# Patient Record
Sex: FEMALE | Race: WHITE | Employment: OTHER | URBAN - METROPOLITAN AREA
[De-identification: names, ages, dates, MRNs, and addresses within clinical notes are randomized per-mention and may not be internally consistent; named-entity substitution may affect disease eponyms.]

---

## 2022-12-15 ENCOUNTER — APPOINTMENT (OUTPATIENT)
Dept: GENERAL RADIOLOGY | Age: 87
DRG: 689 | End: 2022-12-15
Attending: EMERGENCY MEDICINE
Payer: MEDICARE

## 2022-12-15 ENCOUNTER — HOSPITAL ENCOUNTER (INPATIENT)
Age: 87
LOS: 4 days | Discharge: HOME HEALTH | DRG: 689 | End: 2022-12-19
Attending: EMERGENCY MEDICINE | Admitting: FAMILY MEDICINE
Payer: MEDICARE

## 2022-12-15 DIAGNOSIS — N39.0 URINARY TRACT INFECTION WITHOUT HEMATURIA, SITE UNSPECIFIED: Primary | ICD-10-CM

## 2022-12-15 LAB
ALBUMIN SERPL-MCNC: 3.5 G/DL (ref 3.5–5)
ALBUMIN/GLOB SERPL: 0.7 {RATIO} (ref 1.1–2.2)
ALP SERPL-CCNC: 87 U/L (ref 45–117)
ALT SERPL-CCNC: 19 U/L (ref 12–78)
ANION GAP SERPL CALC-SCNC: 5 MMOL/L (ref 5–15)
APPEARANCE UR: ABNORMAL
AST SERPL W P-5'-P-CCNC: 12 U/L (ref 15–37)
BACTERIA URNS QL MICRO: ABNORMAL /HPF
BASOPHILS # BLD: 0 K/UL (ref 0–0.1)
BASOPHILS NFR BLD: 1 % (ref 0–1)
BILIRUB SERPL-MCNC: 0.7 MG/DL (ref 0.2–1)
BILIRUB UR QL: NEGATIVE
BUN SERPL-MCNC: 18 MG/DL (ref 6–20)
BUN/CREAT SERPL: 21 (ref 12–20)
CA-I BLD-MCNC: 10 MG/DL (ref 8.5–10.1)
CHLORIDE SERPL-SCNC: 104 MMOL/L (ref 97–108)
CO2 SERPL-SCNC: 30 MMOL/L (ref 21–32)
COLOR UR: ABNORMAL
CREAT SERPL-MCNC: 0.87 MG/DL (ref 0.55–1.02)
DIFFERENTIAL METHOD BLD: ABNORMAL
EOSINOPHIL # BLD: 0.1 K/UL (ref 0–0.4)
EOSINOPHIL NFR BLD: 1 % (ref 0–7)
ERYTHROCYTE [DISTWIDTH] IN BLOOD BY AUTOMATED COUNT: 13.2 % (ref 11.5–14.5)
FLUAV AG NPH QL IA: NEGATIVE
FLUBV AG NOSE QL IA: NEGATIVE
GLOBULIN SER CALC-MCNC: 4.7 G/DL (ref 2–4)
GLUCOSE SERPL-MCNC: 119 MG/DL (ref 65–100)
GLUCOSE UR STRIP.AUTO-MCNC: NEGATIVE MG/DL
HCT VFR BLD AUTO: 43.1 % (ref 35–47)
HGB BLD-MCNC: 13.8 G/DL (ref 11.5–16)
HGB UR QL STRIP: ABNORMAL
IMM GRANULOCYTES # BLD AUTO: 0 K/UL (ref 0–0.04)
IMM GRANULOCYTES NFR BLD AUTO: 0 % (ref 0–0.5)
KETONES UR QL STRIP.AUTO: NEGATIVE MG/DL
LACTATE SERPL-SCNC: 1.7 MMOL/L (ref 0.4–2)
LEUKOCYTE ESTERASE UR QL STRIP.AUTO: ABNORMAL
LYMPHOCYTES # BLD: 1.1 K/UL (ref 0.8–3.5)
LYMPHOCYTES NFR BLD: 13 % (ref 12–49)
MCH RBC QN AUTO: 29.9 PG (ref 26–34)
MCHC RBC AUTO-ENTMCNC: 32 G/DL (ref 30–36.5)
MCV RBC AUTO: 93.5 FL (ref 80–99)
MONOCYTES # BLD: 1.4 K/UL (ref 0–1)
MONOCYTES NFR BLD: 17 % (ref 5–13)
NEUTS SEG # BLD: 5.8 K/UL (ref 1.8–8)
NEUTS SEG NFR BLD: 68 % (ref 32–75)
NITRITE UR QL STRIP.AUTO: POSITIVE
NRBC # BLD: 0 K/UL (ref 0–0.01)
NRBC BLD-RTO: 0 PER 100 WBC
PH UR STRIP: 5 [PH]
PLATELET # BLD AUTO: 274 K/UL (ref 150–400)
PMV BLD AUTO: 9.6 FL (ref 8.9–12.9)
POTASSIUM SERPL-SCNC: 3.9 MMOL/L (ref 3.5–5.1)
PROT SERPL-MCNC: 8.2 G/DL (ref 6.4–8.2)
PROT UR STRIP-MCNC: NEGATIVE MG/DL
RBC # BLD AUTO: 4.61 M/UL (ref 3.8–5.2)
RBC #/AREA URNS HPF: ABNORMAL /HPF (ref 0–5)
SODIUM SERPL-SCNC: 139 MMOL/L (ref 136–145)
SP GR UR REFRACTOMETRY: 1.02 (ref 1–1.03)
UROBILINOGEN UR QL STRIP.AUTO: 1 EU/DL (ref 0.2–1)
WBC # BLD AUTO: 8.5 K/UL (ref 3.6–11)
WBC URNS QL MICRO: >100 /HPF (ref 0–4)

## 2022-12-15 PROCEDURE — 74011250637 HC RX REV CODE- 250/637: Performed by: EMERGENCY MEDICINE

## 2022-12-15 PROCEDURE — 93005 ELECTROCARDIOGRAM TRACING: CPT

## 2022-12-15 PROCEDURE — 85025 COMPLETE CBC W/AUTO DIFF WBC: CPT

## 2022-12-15 PROCEDURE — 83605 ASSAY OF LACTIC ACID: CPT

## 2022-12-15 PROCEDURE — 36415 COLL VENOUS BLD VENIPUNCTURE: CPT

## 2022-12-15 PROCEDURE — 99285 EMERGENCY DEPT VISIT HI MDM: CPT

## 2022-12-15 PROCEDURE — 65270000029 HC RM PRIVATE

## 2022-12-15 PROCEDURE — 80053 COMPREHEN METABOLIC PANEL: CPT

## 2022-12-15 PROCEDURE — 74011250636 HC RX REV CODE- 250/636: Performed by: FAMILY MEDICINE

## 2022-12-15 PROCEDURE — 74011000250 HC RX REV CODE- 250: Performed by: EMERGENCY MEDICINE

## 2022-12-15 PROCEDURE — 71045 X-RAY EXAM CHEST 1 VIEW: CPT

## 2022-12-15 PROCEDURE — 87040 BLOOD CULTURE FOR BACTERIA: CPT

## 2022-12-15 PROCEDURE — 87804 INFLUENZA ASSAY W/OPTIC: CPT

## 2022-12-15 PROCEDURE — 74011250636 HC RX REV CODE- 250/636: Performed by: EMERGENCY MEDICINE

## 2022-12-15 PROCEDURE — 81001 URINALYSIS AUTO W/SCOPE: CPT

## 2022-12-15 RX ORDER — POLYETHYLENE GLYCOL 3350 17 G/17G
17 POWDER, FOR SOLUTION ORAL DAILY PRN
Status: DISCONTINUED | OUTPATIENT
Start: 2022-12-15 | End: 2022-12-19 | Stop reason: HOSPADM

## 2022-12-15 RX ORDER — ACETAMINOPHEN 650 MG/1
650 SUPPOSITORY RECTAL
Status: DISCONTINUED | OUTPATIENT
Start: 2022-12-15 | End: 2022-12-19 | Stop reason: HOSPADM

## 2022-12-15 RX ORDER — SODIUM CHLORIDE 9 MG/ML
75 INJECTION, SOLUTION INTRAVENOUS CONTINUOUS
Status: DISCONTINUED | OUTPATIENT
Start: 2022-12-15 | End: 2022-12-19 | Stop reason: HOSPADM

## 2022-12-15 RX ORDER — ENOXAPARIN SODIUM 100 MG/ML
40 INJECTION SUBCUTANEOUS DAILY
Status: DISCONTINUED | OUTPATIENT
Start: 2022-12-16 | End: 2022-12-19 | Stop reason: HOSPADM

## 2022-12-15 RX ORDER — ACETAMINOPHEN 325 MG/1
650 TABLET ORAL
Status: DISCONTINUED | OUTPATIENT
Start: 2022-12-15 | End: 2022-12-19 | Stop reason: HOSPADM

## 2022-12-15 RX ORDER — ONDANSETRON 2 MG/ML
4 INJECTION INTRAMUSCULAR; INTRAVENOUS
Status: DISCONTINUED | OUTPATIENT
Start: 2022-12-15 | End: 2022-12-19 | Stop reason: HOSPADM

## 2022-12-15 RX ORDER — ONDANSETRON 4 MG/1
4 TABLET, ORALLY DISINTEGRATING ORAL
Status: DISCONTINUED | OUTPATIENT
Start: 2022-12-15 | End: 2022-12-19 | Stop reason: HOSPADM

## 2022-12-15 RX ORDER — ERYTHROMYCIN 5 MG/G
OINTMENT OPHTHALMIC EVERY 6 HOURS
Status: DISCONTINUED | OUTPATIENT
Start: 2022-12-15 | End: 2022-12-19 | Stop reason: HOSPADM

## 2022-12-15 RX ADMIN — CEFTRIAXONE 2 G: 2 INJECTION, POWDER, FOR SOLUTION INTRAMUSCULAR; INTRAVENOUS at 16:22

## 2022-12-15 RX ADMIN — SODIUM CHLORIDE 75 ML/HR: 9 INJECTION, SOLUTION INTRAVENOUS at 22:31

## 2022-12-15 RX ADMIN — ERYTHROMYCIN 1 EACH: 5 OINTMENT OPHTHALMIC at 16:20

## 2022-12-15 NOTE — ACP (ADVANCE CARE PLANNING)
Advance Care Planning   Healthcare Decision Maker:       Primary Decision Maker: Effie Ruiz - [phone]

## 2022-12-15 NOTE — PROGRESS NOTES
Reason for Admission:  UTI                     RUR Score:  9%                   Plan for utilizing home health:    Pt's son Airam Padilla) signed Choice Letter for pt to return to [facility] sent via [facility][1]/pt uses w/c. PCP: First and Last name:  Seen by MD @ the [facility]. Name of Practice:    Are you a current patient: Yes/No: Yes   Approximate date of last visit: Seen @ the facility. Can you participate in a virtual visit with your PCP: No                    Current Advanced Directive/Advance Care Plan: No Order      Healthcare Decision Maker:     Primary Decision Maker: Angela Arredondo - Son - [phone]                  Transition of Care Plan:                    D/C Plan is to return to [facility] via transportation upon discharge.

## 2022-12-15 NOTE — ED PROVIDER NOTES
EMERGENCY DEPARTMENT HISTORY AND PHYSICAL EXAM      Date: 12/15/2022  Patient Name: Poncho Esparza    History of Presenting Illness     Chief Complaint   Patient presents with    Eye Problem    Lethargy       History Provided By: Patient and Patient's Son    HPI: Poncho Esparza,  77-year-old female with hisotry of afib and gerd brought and done for generalized weakness. Patient has reportedly been more weak over the past 4 to 5 days. They also noticed redness to her left eye with some discharge. Son states she was fine 1 week ago when he saw her, alert and talkative. She has had a cough recently. There are no other complaints, changes, or physical findings at this time. Past History     Past Medical History:  No past medical history on file. Past Surgical History:  No past surgical history on file. Family History:  No family history on file. Social History: Allergies:  No Known Allergies    PCP: Shannan Avila MD    No current facility-administered medications on file prior to encounter. No current outpatient medications on file prior to encounter. Review of Systems   Review of Systems   Constitutional:  Negative for chills and fever. HENT:  Negative for sore throat. Eyes:  Positive for discharge. Negative for redness. Respiratory:  Positive for cough. Negative for shortness of breath. Cardiovascular:  Negative for chest pain. Gastrointestinal:  Negative for abdominal pain, nausea and vomiting. Genitourinary:  Negative for flank pain. Musculoskeletal:  Negative for myalgias. Skin:  Negative for rash. Neurological:  Positive for weakness. Negative for headaches. Physical Exam   Physical Exam  Vitals and nursing note reviewed. Constitutional:       General: She is not in acute distress. Appearance: Normal appearance. HENT:      Head: Normocephalic and atraumatic.       Mouth/Throat:      Mouth: Mucous membranes are moist.   Eyes:      Extraocular Movements: Extraocular movements intact. Conjunctiva/sclera: Conjunctivae normal.   Cardiovascular:      Rate and Rhythm: Normal rate and regular rhythm. Pulmonary:      Effort: Pulmonary effort is normal. No respiratory distress. Breath sounds: Normal breath sounds. No wheezing, rhonchi or rales. Abdominal:      General: There is no distension. Palpations: Abdomen is soft. Tenderness: There is no abdominal tenderness. Musculoskeletal:         General: Normal range of motion. Cervical back: Normal range of motion. Skin:     General: Skin is warm and dry. Neurological:      General: No focal deficit present. Mental Status: She is alert. Comments: Oriented x2. Able to have basic conversation with son. Lab and Diagnostic Study Results   Labs -   No results found for this or any previous visit (from the past 12 hour(s)). Radiologic Studies -   @[email or web address]@  CT Results  (Last 48 hours)      None          CXR Results  (Last 48 hours)      None            Medical Decision Making and ED Course   Differential Diagnosis & Medical Decision Making Provider Note:   80-year-old female presenting with weakness and confusion progressing over the past week. She is weak and less conversant than usual.  Work-up shows evidence of urinary tract infection but otherwise patient is hemodynamically stable. Started on IV Rocephin and admitted to medicine    - I am the first provider for this patient. I reviewed the vital signs, available nursing notes, past medical history, past surgical history, family history and social history. The patients presenting problems have been discussed, and they are in agreement with the care plan formulated and outlined with them. I have encouraged them to ask questions as they arise throughout their visit. Vital Signs-Reviewed the patient's vital signs.   Patient Vitals for the past 12 hrs:   Temp Pulse Resp BP SpO2   12/15/22 1341 98 °F (36.7 °C) 61 18 (!) 150/61 93 %       ED Course:   ED Course as of 12/15/22 1558   Thu Dec 15, 2022   1427 A. fib rate 66 left axis deviation, normal intervals, no ST elevation or depression,  [KK]      ED Course User Index  [KK] Will Cuellar MD          Disposition   Disposition: Admitted to Floor Medical Floor the case was discussed with the admitting physician Dr Galan     Diagnosis/Clinical Impression     Clinical Impression:   1. Urinary tract infection without hematuria, site unspecified        Attestations: Kailey MINOR MD, am the primary clinician of record. Please note that this dictation was completed with Yesmywine, the computer voice recognition software. Quite often unanticipated grammatical, syntax, homophones, and other interpretive errors are inadvertently transcribed by the computer software. Please disregard these errors. Please excuse any errors that have escaped final proofreading. Thank you.

## 2022-12-15 NOTE — Clinical Note
Status[de-identified] INPATIENT [101]   Type of Bed: Medical [8]   Cardiac Monitoring Required?: No   Inpatient Hospitalization Certified Necessary for the Following Reasons: 3.  Patient receiving treatment that can only be provided in an inpatient setting (further clarification in H&P documentation)   Admitting Diagnosis: UTI (urinary tract infection) [920439]   Admitting Physician: Surinder Shelby [[id number]]   Attending Physician: Surinder Shelby [[id number]]   Estimated Length of Stay: 2 Midnights   Discharge Plan[de-identified] Extended Care Facility (e.g. Adult Home, Nursing Home, etc.)

## 2022-12-15 NOTE — ED TRIAGE NOTES
Patient arrives via ems from [facility] for a red left eye that staff noticed this am, patient has dementia htn  afib gerd.  Ems reports that the [facility] states shes been more lethargic than normal and her intake has lessened

## 2022-12-16 LAB
ALBUMIN SERPL-MCNC: 3.2 G/DL (ref 3.5–5)
ALBUMIN/GLOB SERPL: 0.8 {RATIO} (ref 1.1–2.2)
ALP SERPL-CCNC: 79 U/L (ref 45–117)
ALT SERPL-CCNC: 16 U/L (ref 12–78)
ANION GAP SERPL CALC-SCNC: 8 MMOL/L (ref 5–15)
AST SERPL W P-5'-P-CCNC: 10 U/L (ref 15–37)
ATRIAL RATE: 326 BPM
BASOPHILS # BLD: 0 K/UL (ref 0–0.1)
BASOPHILS NFR BLD: 0 % (ref 0–1)
BILIRUB SERPL-MCNC: 0.5 MG/DL (ref 0.2–1)
BUN SERPL-MCNC: 19 MG/DL (ref 6–20)
BUN/CREAT SERPL: 26 (ref 12–20)
CA-I BLD-MCNC: 9.4 MG/DL (ref 8.5–10.1)
CALCULATED R AXIS, ECG10: -18 DEGREES
CALCULATED T AXIS, ECG11: -143 DEGREES
CHLORIDE SERPL-SCNC: 105 MMOL/L (ref 97–108)
CO2 SERPL-SCNC: 29 MMOL/L (ref 21–32)
CREAT SERPL-MCNC: 0.74 MG/DL (ref 0.55–1.02)
DIAGNOSIS, 93000: NORMAL
DIFFERENTIAL METHOD BLD: ABNORMAL
EOSINOPHIL # BLD: 0.1 K/UL (ref 0–0.4)
EOSINOPHIL NFR BLD: 2 % (ref 0–7)
ERYTHROCYTE [DISTWIDTH] IN BLOOD BY AUTOMATED COUNT: 13.2 % (ref 11.5–14.5)
GLOBULIN SER CALC-MCNC: 3.9 G/DL (ref 2–4)
GLUCOSE SERPL-MCNC: 98 MG/DL (ref 65–100)
HCT VFR BLD AUTO: 42.3 % (ref 35–47)
HGB BLD-MCNC: 13.5 G/DL (ref 11.5–16)
IMM GRANULOCYTES # BLD AUTO: 0 K/UL (ref 0–0.04)
IMM GRANULOCYTES NFR BLD AUTO: 0 % (ref 0–0.5)
LYMPHOCYTES # BLD: 1.2 K/UL (ref 0.8–3.5)
LYMPHOCYTES NFR BLD: 16 % (ref 12–49)
MCH RBC QN AUTO: 29.9 PG (ref 26–34)
MCHC RBC AUTO-ENTMCNC: 31.9 G/DL (ref 30–36.5)
MCV RBC AUTO: 93.8 FL (ref 80–99)
MONOCYTES # BLD: 1.5 K/UL (ref 0–1)
MONOCYTES NFR BLD: 20 % (ref 5–13)
NEUTS SEG # BLD: 4.7 K/UL (ref 1.8–8)
NEUTS SEG NFR BLD: 62 % (ref 32–75)
NRBC # BLD: 0 K/UL (ref 0–0.01)
NRBC BLD-RTO: 0 PER 100 WBC
PLATELET # BLD AUTO: 254 K/UL (ref 150–400)
PMV BLD AUTO: 9.7 FL (ref 8.9–12.9)
POTASSIUM SERPL-SCNC: 3.7 MMOL/L (ref 3.5–5.1)
PROT SERPL-MCNC: 7.1 G/DL (ref 6.4–8.2)
Q-T INTERVAL, ECG07: 410 MS
QRS DURATION, ECG06: 82 MS
QTC CALCULATION (BEZET), ECG08: 429 MS
RBC # BLD AUTO: 4.51 M/UL (ref 3.8–5.2)
SODIUM SERPL-SCNC: 142 MMOL/L (ref 136–145)
VENTRICULAR RATE, ECG03: 66 BPM
WBC # BLD AUTO: 7.6 K/UL (ref 3.6–11)

## 2022-12-16 PROCEDURE — 85025 COMPLETE CBC W/AUTO DIFF WBC: CPT

## 2022-12-16 PROCEDURE — 80053 COMPREHEN METABOLIC PANEL: CPT

## 2022-12-16 PROCEDURE — 74011250636 HC RX REV CODE- 250/636: Performed by: FAMILY MEDICINE

## 2022-12-16 PROCEDURE — 65270000029 HC RM PRIVATE

## 2022-12-16 PROCEDURE — 36415 COLL VENOUS BLD VENIPUNCTURE: CPT

## 2022-12-16 PROCEDURE — 74011000250 HC RX REV CODE- 250: Performed by: FAMILY MEDICINE

## 2022-12-16 PROCEDURE — 74011250637 HC RX REV CODE- 250/637: Performed by: FAMILY MEDICINE

## 2022-12-16 RX ADMIN — ERYTHROMYCIN: 5 OINTMENT OPHTHALMIC at 12:00

## 2022-12-16 RX ADMIN — ENOXAPARIN SODIUM 40 MG: 100 INJECTION SUBCUTANEOUS at 10:01

## 2022-12-16 RX ADMIN — ERYTHROMYCIN: 5 OINTMENT OPHTHALMIC at 18:00

## 2022-12-16 RX ADMIN — SODIUM CHLORIDE 75 ML/HR: 9 INJECTION, SOLUTION INTRAVENOUS at 17:52

## 2022-12-16 RX ADMIN — ERYTHROMYCIN: 5 OINTMENT OPHTHALMIC at 01:11

## 2022-12-16 RX ADMIN — CEFTRIAXONE SODIUM 1 G: 1 INJECTION, POWDER, FOR SOLUTION INTRAMUSCULAR; INTRAVENOUS at 18:10

## 2022-12-16 RX ADMIN — ERYTHROMYCIN: 5 OINTMENT OPHTHALMIC at 05:52

## 2022-12-16 NOTE — PROGRESS NOTES
General Daily Progress Note          Patient Name:   Wilton Roberson       YOB: 1932       Age:  80 y.o. Admit Date: 12/15/2022      Subjective:     Patient is a 80y.o. year old female with history of A. fib GERD advanced dementia depression anxiety was transferred from the assisted living because of generalized weakness according to the patient's son patient was weak for 4 to 5 days noted some redness in the left eye and some discharge not eating drinking well and some cough  But now patient not talking much seen by the ER physician work-up in the ER positive for UTI patient was recommended admitted for further evaluation treatment      Resting the bed alert awake no distress no new complaints             Objective:     Visit Vitals  BP (!) 188/77 (BP 1 Location: Right upper arm, BP Patient Position: At rest)   Pulse 65   Temp 97.6 °F (36.4 °C)   Resp 18   Ht 5' 6\" (1.676 m)   Wt 81.6 kg (180 lb)   SpO2 95%   BMI 29.05 kg/m²        Recent Results (from the past 24 hour(s))   METABOLIC PANEL, COMPREHENSIVE    Collection Time: 12/15/22  2:23 PM   Result Value Ref Range    Sodium 139 136 - 145 mmol/L    Potassium 3.9 3.5 - 5.1 mmol/L    Chloride 104 97 - 108 mmol/L    CO2 30 21 - 32 mmol/L    Anion gap 5 5 - 15 mmol/L    Glucose 119 (H) 65 - 100 mg/dL    BUN 18 6 - 20 mg/dL    Creatinine 0.87 0.55 - 1.02 mg/dL    BUN/Creatinine ratio 21 (H) 12 - 20      eGFR >60 >60 ml/min/1.73m2    Calcium 10.0 8.5 - 10.1 mg/dL    Bilirubin, total 0.7 0.2 - 1.0 mg/dL    AST (SGOT) 12 (L) 15 - 37 U/L    ALT (SGPT) 19 12 - 78 U/L    Alk.  phosphatase 87 45 - 117 U/L    Protein, total 8.2 6.4 - 8.2 g/dL    Albumin 3.5 3.5 - 5.0 g/dL    Globulin 4.7 (H) 2.0 - 4.0 g/dL    A-G Ratio 0.7 (L) 1.1 - 2.2     CBC WITH AUTOMATED DIFF    Collection Time: 12/15/22  2:23 PM   Result Value Ref Range    WBC 8.5 3.6 - 11.0 K/uL    RBC 4.61 3.80 - 5.20 M/uL    HGB 13.8 11.5 - 16.0 g/dL    HCT 43.1 35.0 - 47.0 %    MCV 93.5 80.0 - 99.0 FL    MCH 29.9 26.0 - 34.0 PG    MCHC 32.0 30.0 - 36.5 g/dL    RDW 13.2 11.5 - 14.5 %    PLATELET 038 760 - 566 K/uL    MPV 9.6 8.9 - 12.9 FL    NRBC 0.0 0.0  WBC    ABSOLUTE NRBC 0.00 0.00 - 0.01 K/uL    NEUTROPHILS 68 32 - 75 %    LYMPHOCYTES 13 12 - 49 %    MONOCYTES 17 (H) 5 - 13 %    EOSINOPHILS 1 0 - 7 %    BASOPHILS 1 0 - 1 %    IMMATURE GRANULOCYTES 0 0 - 0.5 %    ABS. NEUTROPHILS 5.8 1.8 - 8.0 K/UL    ABS. LYMPHOCYTES 1.1 0.8 - 3.5 K/UL    ABS. MONOCYTES 1.4 (H) 0.0 - 1.0 K/UL    ABS. EOSINOPHILS 0.1 0.0 - 0.4 K/UL    ABS. BASOPHILS 0.0 0.0 - 0.1 K/UL    ABS. IMM.  GRANS. 0.0 0.00 - 0.04 K/UL    DF AUTOMATED     LACTIC ACID    Collection Time: 12/15/22  2:23 PM   Result Value Ref Range    Lactic acid 1.7 0.4 - 2.0 mmol/L   INFLUENZA A & B AG (RAPID TEST)    Collection Time: 12/15/22  2:23 PM   Result Value Ref Range    Influenza A Antigen Negative Negative      Influenza B Antigen Negative Negative     URINALYSIS W/ RFLX MICROSCOPIC    Collection Time: 12/15/22  2:49 PM   Result Value Ref Range    Color Yellow/Straw      Appearance Hazy (A) Clear      Specific gravity 1.025 1.003 - 1.030      pH (UA) 5.0      Protein Negative Negative mg/dL    Glucose Negative Negative mg/dL    Ketone Negative Negative mg/dL    Bilirubin Negative Negative      Blood Small (A) Negative      Urobilinogen 1.0 0.2 - 1.0 EU/dL    Nitrites Positive (A) Negative      Leukocyte Esterase Large (A) Negative     URINE MICROSCOPIC    Collection Time: 12/15/22  2:49 PM   Result Value Ref Range    WBC >100 (H) 0 - 4 /hpf    RBC 5-10 0 - 5 /hpf    Bacteria 1+ (A) Negative /hpf   CULTURE, BLOOD    Collection Time: 12/15/22  7:42 PM    Specimen: Blood   Result Value Ref Range    Special Requests: No Special Requests  Right        Culture result: No growth after 3 hours     CULTURE, BLOOD    Collection Time: 12/15/22  7:42 PM    Specimen: Blood   Result Value Ref Range    Special Requests: Left      Culture result: No growth after 3 hours     METABOLIC PANEL, COMPREHENSIVE    Collection Time: 12/16/22  7:12 AM   Result Value Ref Range    Sodium 142 136 - 145 mmol/L    Potassium 3.7 3.5 - 5.1 mmol/L    Chloride 105 97 - 108 mmol/L    CO2 29 21 - 32 mmol/L    Anion gap 8 5 - 15 mmol/L    Glucose 98 65 - 100 mg/dL    BUN 19 6 - 20 mg/dL    Creatinine 0.74 0.55 - 1.02 mg/dL    BUN/Creatinine ratio 26 (H) 12 - 20      eGFR >60 >60 ml/min/1.73m2    Calcium 9.4 8.5 - 10.1 mg/dL    Bilirubin, total 0.5 0.2 - 1.0 mg/dL    AST (SGOT) 10 (L) 15 - 37 U/L    ALT (SGPT) 16 12 - 78 U/L    Alk. phosphatase 79 45 - 117 U/L    Protein, total 7.1 6.4 - 8.2 g/dL    Albumin 3.2 (L) 3.5 - 5.0 g/dL    Globulin 3.9 2.0 - 4.0 g/dL    A-G Ratio 0.8 (L) 1.1 - 2.2     CBC WITH AUTOMATED DIFF    Collection Time: 12/16/22  7:12 AM   Result Value Ref Range    WBC 7.6 3.6 - 11.0 K/uL    RBC 4.51 3.80 - 5.20 M/uL    HGB 13.5 11.5 - 16.0 g/dL    HCT 42.3 35.0 - 47.0 %    MCV 93.8 80.0 - 99.0 FL    MCH 29.9 26.0 - 34.0 PG    MCHC 31.9 30.0 - 36.5 g/dL    RDW 13.2 11.5 - 14.5 %    PLATELET 399 451 - 653 K/uL    MPV 9.7 8.9 - 12.9 FL    NRBC 0.0 0.0  WBC    ABSOLUTE NRBC 0.00 0.00 - 0.01 K/uL    NEUTROPHILS 62 32 - 75 %    LYMPHOCYTES 16 12 - 49 %    MONOCYTES 20 (H) 5 - 13 %    EOSINOPHILS 2 0 - 7 %    BASOPHILS 0 0 - 1 %    IMMATURE GRANULOCYTES 0 0 - 0.5 %    ABS. NEUTROPHILS 4.7 1.8 - 8.0 K/UL    ABS. LYMPHOCYTES 1.2 0.8 - 3.5 K/UL    ABS. MONOCYTES 1.5 (H) 0.0 - 1.0 K/UL    ABS. EOSINOPHILS 0.1 0.0 - 0.4 K/UL    ABS. BASOPHILS 0.0 0.0 - 0.1 K/UL    ABS. IMM. GRANS. 0.0 0.00 - 0.04 K/UL    DF AUTOMATED       [unfilled]      Review of Systems    Constitutional: Negative for chills and fever. HENT: Negative. Eyes: Negative. Respiratory: Negative. Cardiovascular: Negative. Gastrointestinal: Negative for abdominal pain and nausea. Skin: Negative. Neurological: Negative.         Physical Exam:      Constitutional: pt is oriented to person, place, and time.   HENT:   Head: Normocephalic and atraumatic. Eyes: Pupils are equal, round, and reactive to light. EOM are normal.   Cardiovascular: Normal rate, regular rhythm and normal heart sounds. Pulmonary/Chest: Breath sounds normal. No wheezes. No rales. Exhibits no tenderness. Abdominal: Soft. Bowel sounds are normal. There is no abdominal tenderness. There is no rebound and no guarding. Musculoskeletal: Normal range of motion. Neurological: pt is alert and oriented to person, place, and time. XR CHEST PORT   Final Result      No acute process on portable chest.              Recent Results (from the past 24 hour(s))   METABOLIC PANEL, COMPREHENSIVE    Collection Time: 12/15/22  2:23 PM   Result Value Ref Range    Sodium 139 136 - 145 mmol/L    Potassium 3.9 3.5 - 5.1 mmol/L    Chloride 104 97 - 108 mmol/L    CO2 30 21 - 32 mmol/L    Anion gap 5 5 - 15 mmol/L    Glucose 119 (H) 65 - 100 mg/dL    BUN 18 6 - 20 mg/dL    Creatinine 0.87 0.55 - 1.02 mg/dL    BUN/Creatinine ratio 21 (H) 12 - 20      eGFR >60 >60 ml/min/1.73m2    Calcium 10.0 8.5 - 10.1 mg/dL    Bilirubin, total 0.7 0.2 - 1.0 mg/dL    AST (SGOT) 12 (L) 15 - 37 U/L    ALT (SGPT) 19 12 - 78 U/L    Alk.  phosphatase 87 45 - 117 U/L    Protein, total 8.2 6.4 - 8.2 g/dL    Albumin 3.5 3.5 - 5.0 g/dL    Globulin 4.7 (H) 2.0 - 4.0 g/dL    A-G Ratio 0.7 (L) 1.1 - 2.2     CBC WITH AUTOMATED DIFF    Collection Time: 12/15/22  2:23 PM   Result Value Ref Range    WBC 8.5 3.6 - 11.0 K/uL    RBC 4.61 3.80 - 5.20 M/uL    HGB 13.8 11.5 - 16.0 g/dL    HCT 43.1 35.0 - 47.0 %    MCV 93.5 80.0 - 99.0 FL    MCH 29.9 26.0 - 34.0 PG    MCHC 32.0 30.0 - 36.5 g/dL    RDW 13.2 11.5 - 14.5 %    PLATELET 363 507 - 893 K/uL    MPV 9.6 8.9 - 12.9 FL    NRBC 0.0 0.0  WBC    ABSOLUTE NRBC 0.00 0.00 - 0.01 K/uL    NEUTROPHILS 68 32 - 75 %    LYMPHOCYTES 13 12 - 49 %    MONOCYTES 17 (H) 5 - 13 %    EOSINOPHILS 1 0 - 7 %    BASOPHILS 1 0 - 1 %    IMMATURE GRANULOCYTES 0 0 - 0.5 %    ABS. NEUTROPHILS 5.8 1.8 - 8.0 K/UL    ABS. LYMPHOCYTES 1.1 0.8 - 3.5 K/UL    ABS. MONOCYTES 1.4 (H) 0.0 - 1.0 K/UL    ABS. EOSINOPHILS 0.1 0.0 - 0.4 K/UL    ABS. BASOPHILS 0.0 0.0 - 0.1 K/UL    ABS. IMM.  GRANS. 0.0 0.00 - 0.04 K/UL    DF AUTOMATED     LACTIC ACID    Collection Time: 12/15/22  2:23 PM   Result Value Ref Range    Lactic acid 1.7 0.4 - 2.0 mmol/L   INFLUENZA A & B AG (RAPID TEST)    Collection Time: 12/15/22  2:23 PM   Result Value Ref Range    Influenza A Antigen Negative Negative      Influenza B Antigen Negative Negative     URINALYSIS W/ RFLX MICROSCOPIC    Collection Time: 12/15/22  2:49 PM   Result Value Ref Range    Color Yellow/Straw      Appearance Hazy (A) Clear      Specific gravity 1.025 1.003 - 1.030      pH (UA) 5.0      Protein Negative Negative mg/dL    Glucose Negative Negative mg/dL    Ketone Negative Negative mg/dL    Bilirubin Negative Negative      Blood Small (A) Negative      Urobilinogen 1.0 0.2 - 1.0 EU/dL    Nitrites Positive (A) Negative      Leukocyte Esterase Large (A) Negative     URINE MICROSCOPIC    Collection Time: 12/15/22  2:49 PM   Result Value Ref Range    WBC >100 (H) 0 - 4 /hpf    RBC 5-10 0 - 5 /hpf    Bacteria 1+ (A) Negative /hpf   CULTURE, BLOOD    Collection Time: 12/15/22  7:42 PM    Specimen: Blood   Result Value Ref Range    Special Requests: No Special Requests  Right        Culture result: No growth after 3 hours     CULTURE, BLOOD    Collection Time: 12/15/22  7:42 PM    Specimen: Blood   Result Value Ref Range    Special Requests: Left      Culture result: No growth after 3 hours     METABOLIC PANEL, COMPREHENSIVE    Collection Time: 12/16/22  7:12 AM   Result Value Ref Range    Sodium 142 136 - 145 mmol/L    Potassium 3.7 3.5 - 5.1 mmol/L    Chloride 105 97 - 108 mmol/L    CO2 29 21 - 32 mmol/L    Anion gap 8 5 - 15 mmol/L    Glucose 98 65 - 100 mg/dL    BUN 19 6 - 20 mg/dL    Creatinine 0.74 0.55 - 1.02 mg/dL    BUN/Creatinine ratio 26 (H) 12 - 20      eGFR >60 >60 ml/min/1.73m2    Calcium 9.4 8.5 - 10.1 mg/dL    Bilirubin, total 0.5 0.2 - 1.0 mg/dL    AST (SGOT) 10 (L) 15 - 37 U/L    ALT (SGPT) 16 12 - 78 U/L    Alk. phosphatase 79 45 - 117 U/L    Protein, total 7.1 6.4 - 8.2 g/dL    Albumin 3.2 (L) 3.5 - 5.0 g/dL    Globulin 3.9 2.0 - 4.0 g/dL    A-G Ratio 0.8 (L) 1.1 - 2.2     CBC WITH AUTOMATED DIFF    Collection Time: 12/16/22  7:12 AM   Result Value Ref Range    WBC 7.6 3.6 - 11.0 K/uL    RBC 4.51 3.80 - 5.20 M/uL    HGB 13.5 11.5 - 16.0 g/dL    HCT 42.3 35.0 - 47.0 %    MCV 93.8 80.0 - 99.0 FL    MCH 29.9 26.0 - 34.0 PG    MCHC 31.9 30.0 - 36.5 g/dL    RDW 13.2 11.5 - 14.5 %    PLATELET 905 013 - 915 K/uL    MPV 9.7 8.9 - 12.9 FL    NRBC 0.0 0.0  WBC    ABSOLUTE NRBC 0.00 0.00 - 0.01 K/uL    NEUTROPHILS 62 32 - 75 %    LYMPHOCYTES 16 12 - 49 %    MONOCYTES 20 (H) 5 - 13 %    EOSINOPHILS 2 0 - 7 %    BASOPHILS 0 0 - 1 %    IMMATURE GRANULOCYTES 0 0 - 0.5 %    ABS. NEUTROPHILS 4.7 1.8 - 8.0 K/UL    ABS. LYMPHOCYTES 1.2 0.8 - 3.5 K/UL    ABS. MONOCYTES 1.5 (H) 0.0 - 1.0 K/UL    ABS. EOSINOPHILS 0.1 0.0 - 0.4 K/UL    ABS. BASOPHILS 0.0 0.0 - 0.1 K/UL    ABS. IMM.  GRANS. 0.0 0.00 - 0.04 K/UL    DF AUTOMATED         Results       Procedure Component Value Units Date/Time    CULTURE, BLOOD #1 [[id number]] Collected: 12/15/22 1942    Order Status: Completed Specimen: Blood Updated: 12/15/22 2256     Special Requests: --        No Special Requests  Right       Culture result: No growth after 3 hours       CULTURE, BLOOD #2 [[id number]] Collected: 12/15/22 1942    Order Status: Completed Specimen: Blood Updated: 12/15/22 2256     Special Requests: Left        Culture result: No growth after 3 hours       CULTURE, URINE [[id number]]     Order Status: Sent Specimen: Urine from Clean catch     CULTURE, BLOOD, PAIRED [[id number]] Collected: 12/15/22 1449    Order Status: Sent Specimen: Blood Updated: 12/15/22 1454    INFLUENZA A & B AG (RAPID TEST) [[id number]] Collected: 12/15/22 1423    Order Status: Completed Specimen: Nasopharyngeal from Nasal washing Updated: 12/15/22 1454     Influenza A Antigen Negative        Influenza B Antigen Negative       COVID-19 WITH INFLUENZA A/B [[id number]]     Order Status: Canceled Specimen: Nasopharyngeal              Labs:     Recent Labs     12/16/22  0712 12/15/22  1423   WBC 7.6 8.5   HGB 13.5 13.8   HCT 42.3 43.1    274     Recent Labs     12/16/22  0712 12/15/22  1423    139   K 3.7 3.9    104   CO2 29 30   BUN 19 18   CREA 0.74 0.87   GLU 98 119*   CA 9.4 10.0     Recent Labs     12/16/22  0712 12/15/22  1423   ALT 16 19   AP 79 87   TBILI 0.5 0.7   TP 7.1 8.2   ALB 3.2* 3.5   GLOB 3.9 4.7*     No results for input(s): INR, PTP, APTT, INREXT in the last 72 hours. No results for input(s): FE, TIBC, PSAT, FERR in the last 72 hours. No results found for: FOL, RBCF   No results for input(s): PH, PCO2, PO2 in the last 72 hours. No results for input(s): CPK, CKNDX, TROIQ in the last 72 hours.     No lab exists for component: CPKMB  No results found for: CHOL, CHOLX, CHLST, CHOLV, HDL, HDLP, LDL, LDLC, DLDLP, TGLX, TRIGL, TRIGP, CHHD, CHHDX  No results found for: [facility]  Lab Results   Component Value Date/Time    Color Yellow/Straw 12/15/2022 02:49 PM    Appearance Hazy (A) 12/15/2022 02:49 PM    Specific gravity 1.025 12/15/2022 02:49 PM    pH (UA) 5.0 12/15/2022 02:49 PM    Protein Negative 12/15/2022 02:49 PM    Glucose Negative 12/15/2022 02:49 PM    Ketone Negative 12/15/2022 02:49 PM    Bilirubin Negative 12/15/2022 02:49 PM    Urobilinogen 1.0 12/15/2022 02:49 PM    Nitrites Positive (A) 12/15/2022 02:49 PM    Leukocyte Esterase Large (A) 12/15/2022 02:49 PM    Bacteria 1+ (A) 12/15/2022 02:49 PM    WBC >100 (H) 12/15/2022 02:49 PM    RBC 5-10 12/15/2022 02:49 PM         Assessment:     Acute metabolic encephalopathy  UTI  Advanced dementia  Anxiety and depression      Plan:     Continue current medications PT OT consult  Follow-up the cultures        Current Facility-Administered Medications:     erythromycin (ILOTYCIN) 5 mg/gram (0.5 %) ophthalmic ointment, , Left Eye, Q6H, Alem Galan MD, Given at 12/16/22 0552    0.9% sodium chloride infusion, 75 mL/hr, IntraVENous, CONTINUOUS, Alem Galan MD, Last Rate: 75 mL/hr at 12/15/22 2231, 75 mL/hr at 12/15/22 2231    acetaminophen (TYLENOL) tablet 650 mg, 650 mg, Oral, Q6H PRN **OR** acetaminophen (TYLENOL) suppository 650 mg, 650 mg, Rectal, Q6H PRN, Alem Galan MD    polyethylene glycol (MIRALAX) packet 17 g, 17 g, Oral, DAILY PRN, Alem Galan MD    ondansetron (ZOFRAN ODT) tablet 4 mg, 4 mg, Oral, Q8H PRN **OR** ondansetron (ZOFRAN) injection 4 mg, 4 mg, IntraVENous, Q6H PRN, Alem Galan MD    enoxaparin (LOVENOX) injection 40 mg, 40 mg, SubCUTAneous, DAILY, Alem Galan MD    cefTRIAXone (ROCEPHIN) 1 g in sterile water (preservative free) 10 mL IV syringe, 1 g, IntraVENous, Q24H, Katherine Galan MD

## 2022-12-16 NOTE — PROGRESS NOTES
Chart reviewed. Patient is from the [facility]. CM called the [facility] @ [phone] and was directed to speak with Floridalma Rodriguez (SIMBA). CM what the DC process is for facility. [facility] stated that they do not accept patients back over the weekend. Nurse would call report to [phone] (memory care unit). Patient will require hard copy scripts and all DC documents can be faxed to [phone]. CM will continue to follow for medical clearance.

## 2022-12-16 NOTE — PROGRESS NOTES
Problem: Falls - Risk of  Goal: *Absence of Falls  Description: Document Pedro Bentley Fall Risk and appropriate interventions in the flowsheet. Outcome: Progressing Towards Goal  Note: Fall Risk Interventions:                                Problem: Patient Education: Go to Patient Education Activity  Goal: Patient/Family Education  Outcome: Progressing Towards Goal     Problem: Pressure Injury - Risk of  Goal: *Prevention of pressure injury  Description: Document Lino Scale and appropriate interventions in the flowsheet.   Outcome: Progressing Towards Goal  Note: Pressure Injury Interventions:  Sensory Interventions: Minimize linen layers    Moisture Interventions: Apply protective barrier, creams and emollients    Activity Interventions: PT/OT evaluation    Mobility Interventions: PT/OT evaluation    Nutrition Interventions: Document food/fluid/supplement intake    Friction and Shear Interventions: Minimize layers

## 2022-12-16 NOTE — PROGRESS NOTES
Spoke to patient's son regarding POC. Son stated patient's diet consisted of bite size and thickened liquids.

## 2022-12-16 NOTE — PROGRESS NOTES
Bedside shift change report given to Benito(oncoming nurse) byMehreen (offgoing nurse). Report included the following information SBAR.

## 2022-12-16 NOTE — PROGRESS NOTES
Attempted bedside swallow evaluation. Patient sleeping upon arrival does not awaken to verbal or tactile stimuli. Unable to complete evaluation at this time. ST to follow.

## 2022-12-16 NOTE — H&P
History and Physical    NAME: Anibal Mancilla   :  1932   MRN:  [id number]     Date/Time:  12/15/2022 7:09 PM    Patient PCP: Margarita, MD Shannan  ______________________________________________________________________             Subjective:     CHIEF COMPLAINT:       Lethargic      HISTORY OF PRESENT ILLNESS:       Patient is a 80y.o. year old female with history of A. fib GERD advanced dementia depression anxiety was transferred from the assisted living because of generalized weakness according to the patient's son patient was weak for 4 to 5 days noted some redness in the left eye and some discharge not eating drinking well and some cough  But now patient not talking much seen by the ER physician work-up in the ER positive for UTI patient was recommended admitted for further evaluation treatment    No past medical history on file. Dementia depression anxiety    No past surgical history on file. Social History     Tobacco Use    Smoking status: Not on file    Smokeless tobacco: Not on file   Substance Use Topics    Alcohol use: Not on file        No family history on file.     No Known Allergies     Prior to Admission medications    Not on File         Current Facility-Administered Medications:     fluorescein (FUL-CAM) 1 mg ophthalmic strip 1 Strip, 1 Strip, Left Eye, NOW, Mohiuddin, Gibson Babinski, MD    fluorescein (FUL-CAM) 1 mg ophthalmic strip, , , , Mohiuddin, Gibson Babinski, MD    erythromycin (ILOTYCIN) 5 mg/gram (0.5 %) ophthalmic ointment, , Left Eye, Q6H, Alem Galan MD, 1 Each at 12/15/22 1620    0.9% sodium chloride infusion, 75 mL/hr, IntraVENous, CONTINUOUS, Mohiuddin, Gibson Babinski, MD    acetaminophen (TYLENOL) tablet 650 mg, 650 mg, Oral, Q6H PRN **OR** acetaminophen (TYLENOL) suppository 650 mg, 650 mg, Rectal, Q6H PRN, Alem Galan MD    polyethylene glycol (MIRALAX) packet 17 g, 17 g, Oral, DAILY PRN, Alem Galan MD    ondansetron (ZOFRAN ODT) tablet 4 mg, 4 mg, Oral, Q8H PRN **OR** ondansetron [facility]) injection 4 mg, 4 mg, IntraVENous, Q6H PRN, Alem Galan MD    [START ON 12/16/2022] enoxaparin (LOVENOX) injection 40 mg, 40 mg, SubCUTAneous, DAILY, Alem Galan MD    cefTRIAXone (ROCEPHIN) 1 g in sterile water (preservative free) 10 mL IV syringe, 1 g, IntraVENous, Q24H, Catarino Galan MD  No current outpatient medications on file. LAB DATA REVIEWED:    Recent Results (from the past 24 hour(s))   METABOLIC PANEL, COMPREHENSIVE    Collection Time: 12/15/22  2:23 PM   Result Value Ref Range    Sodium 139 136 - 145 mmol/L    Potassium 3.9 3.5 - 5.1 mmol/L    Chloride 104 97 - 108 mmol/L    CO2 30 21 - 32 mmol/L    Anion gap 5 5 - 15 mmol/L    Glucose 119 (H) 65 - 100 mg/dL    BUN 18 6 - 20 mg/dL    Creatinine 0.87 0.55 - 1.02 mg/dL    BUN/Creatinine ratio 21 (H) 12 - 20      eGFR >60 >60 ml/min/1.73m2    Calcium 10.0 8.5 - 10.1 mg/dL    Bilirubin, total 0.7 0.2 - 1.0 mg/dL    AST (SGOT) 12 (L) 15 - 37 U/L    ALT (SGPT) 19 12 - 78 U/L    Alk. phosphatase 87 45 - 117 U/L    Protein, total 8.2 6.4 - 8.2 g/dL    Albumin 3.5 3.5 - 5.0 g/dL    Globulin 4.7 (H) 2.0 - 4.0 g/dL    A-G Ratio 0.7 (L) 1.1 - 2.2     CBC WITH AUTOMATED DIFF    Collection Time: 12/15/22  2:23 PM   Result Value Ref Range    WBC 8.5 3.6 - 11.0 K/uL    RBC 4.61 3.80 - 5.20 M/uL    HGB 13.8 11.5 - 16.0 g/dL    HCT 43.1 35.0 - 47.0 %    MCV 93.5 80.0 - 99.0 FL    MCH 29.9 26.0 - 34.0 PG    MCHC 32.0 30.0 - 36.5 g/dL    RDW 13.2 11.5 - 14.5 %    PLATELET 187 932 - 584 K/uL    MPV 9.6 8.9 - 12.9 FL    NRBC 0.0 0.0  WBC    ABSOLUTE NRBC 0.00 0.00 - 0.01 K/uL    NEUTROPHILS 68 32 - 75 %    LYMPHOCYTES 13 12 - 49 %    MONOCYTES 17 (H) 5 - 13 %    EOSINOPHILS 1 0 - 7 %    BASOPHILS 1 0 - 1 %    IMMATURE GRANULOCYTES 0 0 - 0.5 %    ABS. NEUTROPHILS 5.8 1.8 - 8.0 K/UL    ABS. LYMPHOCYTES 1.1 0.8 - 3.5 K/UL    ABS. MONOCYTES 1.4 (H) 0.0 - 1.0 K/UL    ABS. EOSINOPHILS 0.1 0.0 - 0.4 K/UL    ABS.  BASOPHILS 0.0 0.0 - 0.1 K/UL ABS. IMM. GRANS. 0.0 0.00 - 0.04 K/UL    DF AUTOMATED     LACTIC ACID    Collection Time: 12/15/22  2:23 PM   Result Value Ref Range    Lactic acid 1.7 0.4 - 2.0 mmol/L   INFLUENZA A & B AG (RAPID TEST)    Collection Time: 12/15/22  2:23 PM   Result Value Ref Range    Influenza A Antigen Negative Negative      Influenza B Antigen Negative Negative     URINALYSIS W/ RFLX MICROSCOPIC    Collection Time: 12/15/22  2:49 PM   Result Value Ref Range    Color Yellow/Straw      Appearance Hazy (A) Clear      Specific gravity 1.025 1.003 - 1.030      pH (UA) 5.0      Protein Negative Negative mg/dL    Glucose Negative Negative mg/dL    Ketone Negative Negative mg/dL    Bilirubin Negative Negative      Blood Small (A) Negative      Urobilinogen 1.0 0.2 - 1.0 EU/dL    Nitrites Positive (A) Negative      Leukocyte Esterase Large (A) Negative     URINE MICROSCOPIC    Collection Time: 12/15/22  2:49 PM   Result Value Ref Range    WBC >100 (H) 0 - 4 /hpf    RBC 5-10 0 - 5 /hpf    Bacteria 1+ (A) Negative /hpf       XR Results (most recent):  Results from Hospital Encounter encounter on 12/15/22    XR CHEST PORT    Narrative  EXAM:  XR CHEST PORT    INDICATION: Weakness    COMPARISON: none    TECHNIQUE: portable chest AP view    FINDINGS: The cardiac silhouette is within normal limits. The pulmonary  vasculature is within normal limits. There is a line of discoid atelectasis or scarring in the right lower lobe. The  lungs and pleural spaces are otherwise clear. The bones are osteopenic. Impression  No acute process on portable chest.         XR CHEST PORT   Final Result      No acute process on portable chest.              Review of Systems:  Patient not a good historian    Objective:   VITALS:    Visit Vitals  BP (!) 147/64   Pulse 74   Temp 98.4 °F (36.9 °C)   Resp 19   Ht 5' 6\" (1.676 m)   Wt 81.6 kg (180 lb)   SpO2 95%   BMI 29.05 kg/m²       Physical Exam:   Constitutional: Awake.    HENT:   Head: Normocephalic and atraumatic. Eyes: Pupils are equal, round, and reactive to light. EOM are normal.   Cardiovascular: Normal rate, regular rhythm and normal heart sounds. Pulmonary/Chest: Breath sounds normal. No wheezes. No rales. Exhibits no tenderness. Abdominal: Soft. Bowel sounds are normal. There is no abdominal tenderness. There is no rebound and no guarding. Musculoskeletal: Normal range of motion.    Neurological: Awake move all extremities      ASSESSMENT & PLAN:      Acute metabolic encephalopathy  UTI  Advanced dementia  Anxiety and depression      Admit to medical telemetry floor start IV fluids urine culture start IV Rocephin    No home medication list available      Current Facility-Administered Medications:     fluorescein (FUL-CAM) 1 mg ophthalmic strip 1 Strip, 1 Strip, Left Eye, NOW, Justin Galan MD    fluorescein (FUL-CAM) 1 mg ophthalmic strip, , , , Justin Galan MD    erythromycin (ILOTYCIN) 5 mg/gram (0.5 %) ophthalmic ointment, , Left Eye, Q6H, Alem Galan MD, 1 Each at 12/15/22 1620    0.9% sodium chloride infusion, 75 mL/hr, IntraVENous, CONTINUOUS, Justin Galan MD    acetaminophen (TYLENOL) tablet 650 mg, 650 mg, Oral, Q6H PRN **OR** acetaminophen (TYLENOL) suppository 650 mg, 650 mg, Rectal, Q6H PRN, Alem Galan MD    polyethylene glycol (MIRALAX) packet 17 g, 17 g, Oral, DAILY PRN, Alem Galan MD    ondansetron (ZOFRAN ODT) tablet 4 mg, 4 mg, Oral, Q8H PRN **OR** ondansetron (ZOFRAN) injection 4 mg, 4 mg, IntraVENous, Q6H PRN, Alem Galan MD    [START ON 12/16/2022] enoxaparin (LOVENOX) injection 40 mg, 40 mg, SubCUTAneous, DAILY, Alem Galan MD    cefTRIAXone (ROCEPHIN) 1 g in sterile water (preservative free) 10 mL IV syringe, 1 g, IntraVENous, Q24H, Justin Galan MD  No current outpatient medications on file.       ________________________________________________________________________    Signed: Chacho Trivoli, MD

## 2022-12-17 PROCEDURE — 87086 URINE CULTURE/COLONY COUNT: CPT

## 2022-12-17 PROCEDURE — 74011250636 HC RX REV CODE- 250/636: Performed by: FAMILY MEDICINE

## 2022-12-17 PROCEDURE — 65270000029 HC RM PRIVATE

## 2022-12-17 PROCEDURE — 74011000250 HC RX REV CODE- 250: Performed by: FAMILY MEDICINE

## 2022-12-17 PROCEDURE — 74011250637 HC RX REV CODE- 250/637: Performed by: FAMILY MEDICINE

## 2022-12-17 PROCEDURE — 92610 EVALUATE SWALLOWING FUNCTION: CPT

## 2022-12-17 RX ADMIN — ERYTHROMYCIN: 5 OINTMENT OPHTHALMIC at 05:33

## 2022-12-17 RX ADMIN — ERYTHROMYCIN: 5 OINTMENT OPHTHALMIC at 00:20

## 2022-12-17 RX ADMIN — CEFTRIAXONE SODIUM 1 G: 1 INJECTION, POWDER, FOR SOLUTION INTRAMUSCULAR; INTRAVENOUS at 19:00

## 2022-12-17 RX ADMIN — ENOXAPARIN SODIUM 40 MG: 100 INJECTION SUBCUTANEOUS at 09:16

## 2022-12-17 RX ADMIN — SODIUM CHLORIDE 75 ML/HR: 9 INJECTION, SOLUTION INTRAVENOUS at 09:16

## 2022-12-17 RX ADMIN — ERYTHROMYCIN: 5 OINTMENT OPHTHALMIC at 16:29

## 2022-12-17 NOTE — PROGRESS NOTES
Bedside swallow eval completed. Recommend Soft & Bite-Sized diet and moderately-thick liquids with STRICT aspiration precautions. 1:1 feeding assistance with all po, feed only when FULLY awake and alert, slow rate of intake, monitor for s/sx aspiration/penetration, maintain upright HOB position for at least 30 minutes after meals. Recommend MBS to further assess swallow function and rule out aspiration, when patient appropriate to participate. Patient may benefit from RD consult s/t reduced po intake. Cog-linguistic evaluation if/as indicated. Full report to follow. Thank you for this consult.

## 2022-12-17 NOTE — PROGRESS NOTES
General Daily Progress Note          Patient Name:   Elieser Lopez       YOB: 1932       Age:  80 y.o. Admit Date: 12/15/2022      Subjective:     Patient is a 80y.o. year old female with history of A. fib GERD advanced dementia depression anxiety was transferred from the assisted living because of generalized weakness according to the patient's son patient was weak for 4 to 5 days noted some redness in the left eye and some discharge not eating drinking well and some cough  But now patient not talking much seen by the ER physician work-up in the ER positive for UTI patient was recommended admitted for further evaluation treatment      Resting the bed alert awake no distress no new complaints             Objective:     Visit Vitals  /70 (BP 1 Location: Right lower arm, BP Patient Position: At rest)   Pulse 60   Temp 97.1 °F (36.2 °C)   Resp 18   Ht 5' 6\" (1.676 m)   Wt 81.6 kg (180 lb)   SpO2 96%   BMI 29.05 kg/m²        No results found for this or any previous visit (from the past 24 hour(s)). [unfilled]      Review of Systems    Constitutional: Negative for chills and fever. HENT: Negative. Eyes: Negative. Respiratory: Negative. Cardiovascular: Negative. Gastrointestinal: Negative for abdominal pain and nausea. Skin: Negative. Neurological: Negative. Physical Exam:      Constitutional: pt is oriented to person, place, and time. HENT:   Head: Normocephalic and atraumatic. Eyes: Pupils are equal, round, and reactive to light. EOM are normal.   Cardiovascular: Normal rate, regular rhythm and normal heart sounds. Pulmonary/Chest: Breath sounds normal. No wheezes. No rales. Exhibits no tenderness. Abdominal: Soft. Bowel sounds are normal. There is no abdominal tenderness. There is no rebound and no guarding. Musculoskeletal: Normal range of motion. Neurological: pt is alert and oriented to person, place, and time.      XR CHEST PORT   Final Result No acute process on portable chest.              No results found for this or any previous visit (from the past 24 hour(s)). Results       Procedure Component Value Units Date/Time    CULTURE, URINE [[id number]] Collected: 12/17/22 0535    Order Status: Canceled Specimen: Urine     CULTURE, URINE [[id number]] Collected: 12/17/22 0535    Order Status: Sent Specimen: Urine Updated: 12/17/22 0547    CULTURE, BLOOD #1 [[id number]] Collected: 12/15/22 1942    Order Status: Completed Specimen: Blood Updated: 12/17/22 1204     Special Requests: --        No Special Requests  Right       Culture result: No growth 2 days       CULTURE, BLOOD #2 [[id number]] Collected: 12/15/22 1942    Order Status: Completed Specimen: Blood Updated: 12/17/22 1204     Special Requests: Left        Culture result: No growth 2 days       CULTURE, BLOOD, PAIRED [[id number]] Collected: 12/15/22 1449    Order Status: Completed Specimen: Blood Updated: 12/17/22 1204     Special Requests: No Special Requests        Culture result: No growth 1 day       INFLUENZA A & B AG (RAPID TEST) [[id number]] Collected: 12/15/22 1423    Order Status: Completed Specimen: Nasopharyngeal from Nasal washing Updated: 12/15/22 1454     Influenza A Antigen Negative        Influenza B Antigen Negative       COVID-19 WITH INFLUENZA A/B [[id number]]     Order Status: Canceled Specimen: Nasopharyngeal              Labs:     Recent Labs     12/16/22  0712 12/15/22  1423   WBC 7.6 8.5   HGB 13.5 13.8   HCT 42.3 43.1    274       Recent Labs     12/16/22  0712 12/15/22  1423    139   K 3.7 3.9    104   CO2 29 30   BUN 19 18   CREA 0.74 0.87   GLU 98 119*   CA 9.4 10.0       Recent Labs     12/16/22  0712 12/15/22  1423   ALT 16 19   AP 79 87   TBILI 0.5 0.7   TP 7.1 8.2   ALB 3.2* 3.5   GLOB 3.9 4.7*       No results for input(s): INR, PTP, APTT, INREXT, INREXT in the last 72 hours. No results for input(s): FE, TIBC, PSAT, FERR in the last 72 hours. No results found for: FOL, RBCF   No results for input(s): PH, PCO2, PO2 in the last 72 hours. No results for input(s): CPK, CKNDX, TROIQ in the last 72 hours.     No lab exists for component: CPKMB  No results found for: CHOL, CHOLX, CHLST, CHOLV, HDL, HDLP, LDL, LDLC, DLDLP, TGLX, TRIGL, TRIGP, CHHD, CHHDX  No results found for: [facility]  Lab Results   Component Value Date/Time    Color Yellow/Straw 12/15/2022 02:49 PM    Appearance Hazy (A) 12/15/2022 02:49 PM    Specific gravity 1.025 12/15/2022 02:49 PM    pH (UA) 5.0 12/15/2022 02:49 PM    Protein Negative 12/15/2022 02:49 PM    Glucose Negative 12/15/2022 02:49 PM    Ketone Negative 12/15/2022 02:49 PM    Bilirubin Negative 12/15/2022 02:49 PM    Urobilinogen 1.0 12/15/2022 02:49 PM    Nitrites Positive (A) 12/15/2022 02:49 PM    Leukocyte Esterase Large (A) 12/15/2022 02:49 PM    Bacteria 1+ (A) 12/15/2022 02:49 PM    WBC >100 (H) 12/15/2022 02:49 PM    RBC 5-10 12/15/2022 02:49 PM         Assessment:     Acute metabolic encephalopathy  UTI  Advanced dementia  Anxiety and depression      Plan:     Continue current medications PT OT consult  Follow-up the cultures  Discharge home tomorrow        Current Facility-Administered Medications:     erythromycin (ILOTYCIN) 5 mg/gram (0.5 %) ophthalmic ointment, , Left Eye, Q6H, Alem Galan MD, Given at 12/17/22 0533    0.9% sodium chloride infusion, 75 mL/hr, IntraVENous, CONTINUOUS, Alem Galan MD, Last Rate: 75 mL/hr at 12/17/22 0916, 75 mL/hr at 12/17/22 0916    acetaminophen (TYLENOL) tablet 650 mg, 650 mg, Oral, Q6H PRN **OR** acetaminophen (TYLENOL) suppository 650 mg, 650 mg, Rectal, Q6H PRN, Alem Galan MD    polyethylene glycol (MIRALAX) packet 17 g, 17 g, Oral, DAILY PRN, Alem Galan MD    ondansetron (ZOFRAN ODT) tablet 4 mg, 4 mg, Oral, Q8H PRN **OR** ondansetron (ZOFRAN) injection 4 mg, 4 mg, IntraVENous, Q6H PRN, Alem Galan MD    enoxaparin (LOVENOX) injection 40 mg, 40 mg, SubCUTAneous, DAILY, Alem Galan MD, 40 mg at 12/17/22 0916    cefTRIAXone (ROCEPHIN) 1 g in sterile water (preservative free) 10 mL IV syringe, 1 g, IntraVENous, Q24H, Alem Galan MD, 1 g at 12/16/22 1810

## 2022-12-17 NOTE — PROGRESS NOTES
SPEECH LANGUAGE PATHOLOGY BEDSIDE SWALLOW EVALUATION  Patient: Glenn Hernandez (62 y.o. female)  Date: 12/17/2022  Primary Diagnosis: UTI (urinary tract infection) [N39.0]       Precautions: aspiration       ASSESSMENT :  Based on the objective data described below, the patient presents with mild-moderate oropharyngeal dysphagia. Nsg reports patient with minimal po intake. Per chart review, patient's son reports patient's diet prior to admission was SBS/thickened liquids. Patient sleeping soundly in bed, awakened with verbal cues and sternal rub. Repositioned upright in bed as much as tolerated. Patient oriented to self only. Minimal verbalizations. Patient accepted thin, mildly thick, and moderately thick liquids; with encouragement, she was agreeable to SBS. Overall slowness appreciated. Oral phase c/b incomplete lip closure, requiring verbal cues, as well as slow mastication. Slow a-p transit. Pharyngeal phase c/b swallow initiation delayed, HLE adequate to palpation once initiated. Clinical indicators asp/pen c/b weak cough and throat clear with thin and mildly thick liquids. Reduced endurance with po. Patient will benefit from skilled intervention to address the above impairments. Patients rehabilitation potential is considered to be Fair. PLAN :  Recommendations and Planned Interventions:  Recommend Soft & Bite-Sized diet and moderately-thick liquids with STRICT aspiration precautions. 1:1 feeding assistance with all po, feed only when FULLY awake and alert, slow rate of intake, monitor for s/sx aspiration/penetration, maintain upright HOB position for at least 30 minutes after meals. Recommend MBS to further assess swallow function and rule out aspiration, when patient appropriate to participate. Patient may benefit from RD consult s/t reduced po intake. Cog-linguistic evaluation if/as indicated.      Frequency/Duration: Patient will be followed by speech-language pathology 6 times a week to address goals. Discharge Recommendations: To Be Determined     SUBJECTIVE:   Patient agreeable to beginning bedside swallow evaluation. OBJECTIVE:     CXR Results  (Last 48 hours)                 12/15/22 1425  XR CHEST PORT Final result    Impression:      No acute process on portable chest.           Narrative:  EXAM:  XR CHEST PORT       INDICATION: Weakness       COMPARISON: none       TECHNIQUE: portable chest AP view       FINDINGS: The cardiac silhouette is within normal limits. The pulmonary   vasculature is within normal limits. There is a line of discoid atelectasis or scarring in the right lower lobe. The   lungs and pleural spaces are otherwise clear. The bones are osteopenic. CT Results  (Last 48 hours)      None             No past medical history on file. No past surgical history on file. Prior Level of Function/Home Situation:   Home Situation  Home Environment: Assisted living  One/Two Story Residence: One story  Living Alone: No  Support Systems: Child(markell), Solo Heredia  Patient Expects to be Discharged to[de-identified] Group home  Current DME Used/Available at Home: None  Diet prior to admission: SBS/thickened liquids  Current Diet:  SBS/mod thick     Cognitive and Communication Status:  Neurologic State: Eyes open to voice, Confused  Orientation Level: Oriented to person, Disoriented to place, Disoriented to situation, Disoriented to time  Cognition: Decreased attention/concentration, Decreased command following, Poor safety awareness           Swallowing Evaluation:   Oral Assessment:     P.O. Trials:  Patient Position: upright in bed as much as tolerated but slightly reclined  Vocal quality prior to P.O.: No impairment  Consistency Presented: Thin liquid; Nectar thick liquid;Honey thick liquid; Other (comment) (SBS)  How Presented: SLP-fed/presented;Cup/sip;Spoon;Straw     Bolus Acceptance: No impairment  Bolus Formation/Control: Impaired  Type of Impairment: Incomplete;Lip closure;Mastication  Propulsion: Delayed (# of seconds)  Oral Residue: None  Initiation of Swallow: Delayed (# of seconds)  Laryngeal Elevation: Functional  Aspiration Signs/Symptoms: Weak cough;Clear throat  Pharyngeal Phase Characteristics: No impairment, issues, or problems   Effective Modifications: Small sips and bites          Oral Phase Severity: Mild-moderate  Pharyngeal Phase Severity : Mild-moderate  Voice:                 Vocal Quality: No impairment                          After treatment:   Patient left in no apparent distress in bed, Call bell within reach, and Nursing notified    COMMUNICATION/EDUCATION:   Patient was educated regarding purpose of SLP assessment, POC, diet recs and sw safety precautions. Patient demonstrated [address] understanding as evidenced by AMS, reduced engagement. The patient's plan of care including recommendations, planned interventions, and recommended diet changes were discussed with: Registered nurse. Patient is unable to participate in goal setting and plan of care. Thank you for this referral.  Nisreen Noe M.S. CCC-SLP                  Problem: Dysphagia (Adult)  Goal: *Acute Goals and Plan of Care (Insert Text)  Description: Speech Therapy Swallow Goals  Initiated 12/17/2022  -patient stated goal: unable to state s/t AMS  -Patient will tolerate SBS diet with moderately thick liquids without clinical indicators of aspiration given minimal cues within 7 day(s). -Patient will tolerate PO trials without clinical indicators of aspiration given minimal cues within 7 day(s). -Patient will participate in modified barium swallow study within 7 day(s). -Patient will demonstrate understanding of swallow safety precautions and aspiration precautions, diet recs with minimal cues within 7 day(s).   Note: Initial

## 2022-12-18 LAB
BACTERIA SPEC CULT: NORMAL
SPECIAL REQUESTS,SREQ: NORMAL

## 2022-12-18 PROCEDURE — 65270000029 HC RM PRIVATE

## 2022-12-18 PROCEDURE — 74011250636 HC RX REV CODE- 250/636: Performed by: FAMILY MEDICINE

## 2022-12-18 PROCEDURE — 74011250637 HC RX REV CODE- 250/637: Performed by: FAMILY MEDICINE

## 2022-12-18 PROCEDURE — 97530 THERAPEUTIC ACTIVITIES: CPT

## 2022-12-18 PROCEDURE — 97161 PT EVAL LOW COMPLEX 20 MIN: CPT

## 2022-12-18 RX ADMIN — ERYTHROMYCIN: 5 OINTMENT OPHTHALMIC at 18:02

## 2022-12-18 RX ADMIN — ENOXAPARIN SODIUM 40 MG: 100 INJECTION SUBCUTANEOUS at 10:17

## 2022-12-18 RX ADMIN — ERYTHROMYCIN: 5 OINTMENT OPHTHALMIC at 05:52

## 2022-12-18 RX ADMIN — ACETAMINOPHEN 650 MG: 325 TABLET ORAL at 10:17

## 2022-12-18 RX ADMIN — ERYTHROMYCIN: 5 OINTMENT OPHTHALMIC at 00:48

## 2022-12-18 RX ADMIN — ERYTHROMYCIN: 5 OINTMENT OPHTHALMIC at 12:16

## 2022-12-18 NOTE — DISCHARGE INSTRUCTIONS
Discharge Instructions       PATIENT ID: Rico Vance  MRN: [id number]   YOB: 1932    DATE OF ADMISSION: [unfilled]    DATE OF DISCHARGE: 12/18/2022    PRIMARY CARE PROVIDER: @PCP@     ATTENDING PHYSICIAN: [unfilled]  DISCHARGING PROVIDER: Adrian Borjas MD    To contact this individual call [phone] and ask the  to page. If unavailable ask to be transferred the Adult Hospitalist Department. DISCHARGE DIAGNOSES metabolic encephalopathy    CONSULTATIONS: [unfilled]    PROCEDURES/SURGERIES: * No surgery found *    PENDING TEST RESULTS:   At the time of discharge the following test results are still pending: none    FOLLOW UP APPOINTMENTS:   [unfilled]     ADDITIONAL CARE RECOMMENDATIONS: None    DIET: Regular Diet      ACTIVITY: Activity as tolerated    Wound care: Wound Care Order: submitted to [facility] Please view [email or web address]    EQUIPMENT needed: ***      DISCHARGE MEDICATIONS:   See Medication Reconciliation Form    It is important that you take the medication exactly as they are prescribed. Keep your medication in the bottles provided by the pharmacist and keep a list of the medication names, dosages, and times to be taken in your wallet. Do not take other medications without consulting your doctor. NOTIFY YOUR PHYSICIAN FOR ANY OF THE FOLLOWING:   Fever over 101 degrees for 24 hours. Chest pain, shortness of breath, fever, chills, nausea, vomiting, diarrhea, change in mentation, falling, weakness, bleeding. Severe pain or pain not relieved by medications. Or, any other signs or symptoms that you may have questions about.       DISPOSITION:    Home With:   OT  PT  HH  RN       SNF/Inpatient Rehab/LTAC    Independent/assisted living    Hospice    Other:         PROBLEM LIST Updated:  Yes ***       Signed:   Adrian Borjas MD  12/18/2022  1:16 PM

## 2022-12-18 NOTE — PROGRESS NOTES
DC order noted. Chart reviewed. Pt [facility] a [facility]. Per previous CM note, the pt will be accepted back for post hospital care on Monday 12/19/2022.   Delay entered

## 2022-12-18 NOTE — PROGRESS NOTES
PHYSICAL THERAPY EVALUATION  Patient: Jay French (68 y.o. female)  Date: 12/18/2022  Primary Diagnosis: UTI (urinary tract infection) [N39.0]       Precautions: Fall      ASSESSMENT  Pt is a 80 y.o. female admitted on 12/15/2022 for AMS; pt currently being treated for UTI . Pt semi supine upon PT arrival, agreeable to evaluation. Pt A&O x 2. Based on the objective data described, the patient presents with generalized weakness, impaired functional mobility, impaired amb, impaired balance, and decreased endurance to activities. (See below for objective details and assist levels). Overall pt tolerated session fair to poor today with decreased endurance to activities. Pt will benefit from continued skilled PT to address above deficits and return to PLOF. Current PT DC recommendation [facility] once medically appropriate. Current Level of Function Impacting Discharge (mobility/balance): decreased standing balance and decreased gen strength    Other factors to consider for discharge: SNF      PLAN :  Recommendations and Planned Interventions: bed mobility training, transfer training, gait training, therapeutic exercises, patient and family training/education, and therapeutic activities      Recommend for staff: Encourage HEP in prep for ADLs/mobility and Frequent repositioning to prevent skin breakdown    Frequency/Duration: Patient will be followed by physical therapy:  2-3x/week to address goals. Recommendation for discharge: (in order for the patient to meet his/her long term goals)  [facility]    This discharge recommendation:  Has been made in collaboration with the attending provider and/or case management    IF patient discharges home will need the following DME: rolling walker and wheelchair         SUBJECTIVE:   Patient stated I am not ok.     OBJECTIVE DATA SUMMARY:   HISTORY:    No past medical history on file. No past surgical history on file.     Home Situation  Home Environment: Assisted living  One/Two Story Residence: One story  Living Alone: No  Support Systems: Child(markell), [facility]  Patient Expects to be Discharged to[de-identified] Skilled nursing facility  Current DME Used/Available at Home: None    EXAMINATION/PRESENTATION/DECISION MAKING:   Critical Behavior:  Neurologic State: Confused  Orientation Level: Oriented to person  Cognition: Decreased attention/concentration     Hearing: Auditory  Auditory Impairment: Hard of hearing, bilateral  Skin:  intact where visible  Edema: none  Range Of Motion:  AROM: Generally decreased, functional                       Strength:    Strength: Generally decreased, functional                    Tone & Sensation:                                  Coordination:     Vision:      Functional Mobility:  Bed Mobility:  Rolling: Moderate assistance  Supine to Sit: Moderate assistance  Sit to Supine: Moderate assistance  Scooting: Moderate assistance  Transfers:  Sit to Stand: Moderate assistance;Maximum assistance  Stand to Sit: Moderate assistance;Maximum assistance                       Balance:   Sitting: Intact; With support  Standing: Impaired;Pull to stand; With support  Standing - Static: Fair;Constant support  Standing - Dynamic : Fair;Constant support  Ambulation/Gait Training:  Distance (ft): 3 Feet (ft)  Assistive Device: Gait belt;Walker, rolling  Ambulation - Level of Assistance: Moderate assistance     Gait Description (WDL): Exceptions to [facility]                                      Functional Measure:  Eva Eubanks AM-PAC 6 Clicks         Basic Mobility Inpatient Short Form  How much difficulty does the patient currently have. .. Unable A Lot A Little None   1. Turning over in bed (including adjusting bedclothes, sheets and blankets)? [] 1   [] 2   [x] 3   [] 4   2. Sitting down on and standing up from a chair with arms ( e.g., wheelchair, bedside commode, etc.)   [] 1   [x] 2   [] 3   [] 4   3.   Moving from lying on back to sitting on the side of the bed? [] 1   [] 2   [x] 3   [] 4          How much help from another person does the patient currently need. .. Total A Lot A Little None   4. Moving to and from a bed to a chair (including a wheelchair)? [x] 1   [] 2   [] 3   [] 4   5. Need to walk in hospital room? [x] 1   [] 2   [] 3   [] 4   6. Climbing 3-5 steps with a railing? [x] 1   [] 2   [] 3   [] 4   © , Trustees of [facility], under license to [employer] All rights reserved     Score:  Initial:  Most Recent: X (Date: 2022 )   Interpretation of Tool:  Represents activities that are increasingly more difficult (i.e. Bed mobility, Transfers, Gait). Score 24 23 22-20 19-15 14-10 9-7 6   Modifier CH CI CJ CK CL CM CN         Physical Therapy Evaluation Charge Determination   History Examination Presentation Decision-Making   LOW Complexity : Zero comorbidities / personal factors that will impact the outcome / POC LOW Complexity : 1-2 Standardized tests and measures addressing body structure, function, activity limitation and / or participation in recreation  LOW Complexity : Stable, uncomplicated  Other outcome measures [facility] 6        Based on the above components, the patient evaluation is determined to be of the following complexity level: LOW     Pain Ratin/10     Activity Tolerance:   Fair and Poor    After treatment patient left in no apparent distress:   Bed locked and in lowest position Supine in bed, Heels elevated for pressure relief, Call bell within reach, Bed / chair alarm activated, and Side rails x 3 . GOALS:    Problem: Mobility Impaired (Adult and Pediatric)  Goal: *Acute Goals and Plan of Care (Insert Text)  Description: Patient stated goal: none verbalized  Patient will move from supine to sit and sit to supine , scoot up and down, and roll side to side in bed with minimal assistance/contact guard assist within 7 day(s).     Patient will transfer from bed to chair and chair to bed with minimal assistance/contact guard assist using the least restrictive device within 7 day(s). Patient will improve static standing balance to minimal assistance within 1 week(s). Patient will ambulate 10 feet with minimal assistance with least restrictive device within 1 weeks. Outcome: Progressing Towards Goal       COMMUNICATION/EDUCATION:   The patients plan of care was discussed with: Occupational therapist, Registered nurse, and Case management. Patient is unable to participate in goal setting and plan of care.          Thank you for this referral.  Macarena Nunes, PT   Time Calculation: 20 mins

## 2022-12-18 NOTE — PROGRESS NOTES
Pt pulled out IV and refuses to have new IV placed. Confusion and agitation noted.  Pt to discharge back to [address] in AM.

## 2022-12-18 NOTE — DISCHARGE SUMMARY
Discharge Summary       PATIENT ID: Saida Blackwood  MRN: [id number]   YOB: 1932    DATE OF ADMISSION: 12/15/2022  1:29 PM    DATE OF DISCHARGE:   PRIMARY CARE PROVIDER: Shannan Avila MD     ATTENDING PHYSICIAN: Shannon Galan  DISCHARGING PROVIDER: Shannon Galan      CONSULTATIONS: IP CONSULT TO HOSPITALIST    PROCEDURES/SURGERIES: * No surgery found *    ADMITTING DIAGNOSES:    Patient Active Problem List    Diagnosis Date Noted    UTI (urinary tract infection) 12/15/2022       DISCHARGE DIAGNOSES / PLAN:      Acute metabolic encephalopathy  UTI  Advanced dementia  Anxiety and depression        DISCHARGE MEDICATIONS:  There are no discharge medications for this patient. NOTIFY YOUR PHYSICIAN FOR ANY OF THE FOLLOWING:   Fever over 101 degrees for 24 hours. Chest pain, shortness of breath, fever, chills, nausea, vomiting, diarrhea, change in mentation, falling, weakness, bleeding. Severe pain or pain not relieved by medications. Or, any other signs or symptoms that you may have questions about. DISPOSITION:  x  Home With:   OT  PT  HH  RN       Long term SNF/Inpatient Rehab    Independent/assisted living    Hospice    Other:       PATIENT CONDITION AT DISCHARGE: Stable      PHYSICAL EXAMINATION AT DISCHARGE:  General:          Alert, cooperative, no distress, appears stated age. HEENT:           Atraumatic, anicteric sclerae, pink conjunctivae                          No oral ulcers, mucosa moist, throat clear, dentition fair  Neck:               Supple, symmetrical  Lungs:             Clear to auscultation bilaterally. No Wheezing or Rhonchi. No rales. Chest wall:      No tenderness  No Accessory muscle use. Heart:              Regular  rhythm,  No  murmur   No edema  Abdomen:        Soft, non-tender. Not distended. Bowel sounds normal  Extremities:     No cyanosis. No clubbing,                            Skin turgor normal, Capillary refill normal  Skin:                Not pale. Not Jaundiced  No rashes   Psych:             Not anxious or agitated. Neurologic:      Alert, moves all extremities, answers questions appropriately and responds to commands     XR CHEST PORT   Final Result      No acute process on portable chest.              No results found for this or any previous visit (from the past 24 hour(s)).        HOSPITAL COURSE:    Patient is a [address] year old female with history of A. fib GERD advanced dementia depression anxiety was transferred from the assisted living because of generalized weakness according to the patient's son patient was weak for 4 to 5 days noted some redness in the left eye and some discharge not eating drinking well and some cough  But now patient not talking much seen by the ER physician work-up in the ER positive for UTI patient was recommended admitted for further evaluation treatment        Resting the bed alert awake no distress no new complaints    All cultures are negative patient discharged back to [address]        Signed:   Vinay Acuna MD  12/18/2022  1:17 PM

## 2022-12-19 VITALS
WEIGHT: 179.9 LBS | SYSTOLIC BLOOD PRESSURE: 158 MMHG | HEIGHT: 66 IN | RESPIRATION RATE: 18 BRPM | HEART RATE: 74 BPM | TEMPERATURE: 98.2 F | OXYGEN SATURATION: 97 % | BODY MASS INDEX: 28.91 KG/M2 | DIASTOLIC BLOOD PRESSURE: 90 MMHG

## 2022-12-19 PROCEDURE — 74011250636 HC RX REV CODE- 250/636: Performed by: FAMILY MEDICINE

## 2022-12-19 RX ADMIN — ERYTHROMYCIN 1 EACH: 5 OINTMENT OPHTHALMIC at 00:00

## 2022-12-19 RX ADMIN — ENOXAPARIN SODIUM 40 MG: 100 INJECTION SUBCUTANEOUS at 08:10

## 2022-12-19 RX ADMIN — ERYTHROMYCIN 1 EACH: 5 OINTMENT OPHTHALMIC at 05:52

## 2022-12-19 NOTE — DISCHARGE SUMMARY
Discharge Summary       PATIENT ID: Tenisha Henao  MRN: [id number]   YOB: 1932    DATE OF ADMISSION: 12/15/2022  1:29 PM    DATE OF DISCHARGE:   PRIMARY CARE PROVIDER: Shannan Avila MD     ATTENDING PHYSICIAN: Vergie Kehr Mohiuddin  DISCHARGING PROVIDER: Vergie Kehr Mohiuddin      CONSULTATIONS: IP CONSULT TO HOSPITALIST    PROCEDURES/SURGERIES: * No surgery found *    ADMITTING DIAGNOSES:    Patient Active Problem List    Diagnosis Date Noted    UTI (urinary tract infection) 12/15/2022       DISCHARGE DIAGNOSES / PLAN:      Acute metabolic encephalopathy  UTI  Advanced dementia  Anxiety and depression        DISCHARGE MEDICATIONS:  There are no discharge medications for this patient. NOTIFY YOUR PHYSICIAN FOR ANY OF THE FOLLOWING:   Fever over 101 degrees for 24 hours. Chest pain, shortness of breath, fever, chills, nausea, vomiting, diarrhea, change in mentation, falling, weakness, bleeding. Severe pain or pain not relieved by medications. Or, any other signs or symptoms that you may have questions about. DISPOSITION:  x  Home With:   OT  PT  HH  RN       Long term SNF/Inpatient Rehab    Independent/assisted living    Hospice    Other:       PATIENT CONDITION AT DISCHARGE: Stable      PHYSICAL EXAMINATION AT DISCHARGE:  General:          Alert, cooperative, no distress, appears stated age. HEENT:           Atraumatic, anicteric sclerae, pink conjunctivae                          No oral ulcers, mucosa moist, throat clear, dentition fair  Neck:               Supple, symmetrical  Lungs:             Clear to auscultation bilaterally. No Wheezing or Rhonchi. No rales. Chest wall:      No tenderness  No Accessory muscle use. Heart:              Regular  rhythm,  No  murmur   No edema  Abdomen:        Soft, non-tender. Not distended. Bowel sounds normal  Extremities:     No cyanosis. No clubbing,                            Skin turgor normal, Capillary refill normal  Skin:                Not pale. Not Jaundiced  No rashes   Psych:             Not anxious or agitated. Neurologic:      Alert, moves all extremities, answers questions appropriately and responds to commands     XR CHEST PORT   Final Result      No acute process on portable chest.              No results found for this or any previous visit (from the past 24 hour(s)).        HOSPITAL COURSE:    Patient is a 80y.o. year old female with history of A. fib GERD advanced dementia depression anxiety was transferred from the assisted living because of generalized weakness according to the patient's son patient was weak for 4 to 5 days noted some redness in the left eye and some discharge not eating drinking well and some cough  But now patient not talking much seen by the ER physician work-up in the ER positive for UTI patient was recommended admitted for further evaluation treatment        Resting the bed alert awake no distress no new complaints    All cultures are negative patient discharged back to [address]        Signed:   Ruy Lou MD  12/19/2022  1:17 PM

## 2022-12-19 NOTE — PROGRESS NOTES
DC order noted. Therapy recs for SNF. CM called patients son, Jose Alejandro Vincent @ [phone] to discuss therapy recs. Son states that patient is primarily in her wheelchair all day at facility and he states that he was surprised that she even walked 3ft as started in the therapy note. Son declines for patient to go to a SNF and gives choices for [facility] instead. No preference of agency. CM sent referral.    CM informed son that Medicare only covers stretcher transport and they will not cover the cost if the patient can sit in a chair and has no qualifying bed bound diagnosis. CM stated that family can provide transportation for patient or it will need to be paid for out-of-pocket. Son states that he likes to use  transportation for his mother and gave number to CM to arrange transport for DC. [phone]    CM called number and spoke with Leonardo and confirmed that wheelchair transport will be here at 12pm.    Patient accepted with [facility] date- 12/20. CM called son back to notify him of [facility] SOC date and transport time. Patient will return to the [facility]. Nurse can call report to [phone] (memory care unit). Patient will require hard copy scripts and all DC documents can be faxed to [phone]. CM faxed DC summary. CM notified primary nurse. Medicare pt has received, reviewed, and signed 2nd IM letter informing them of their right to appeal the discharge. Signed copied has been placed on pt bedside chart. Discharge plan of care/case management plan validated with provider discharge order.

## 2022-12-19 NOTE — PROGRESS NOTES
Problem: Falls - Risk of  Goal: *Absence of Falls  Description: Document Jaylen Yusuf Fall Risk and appropriate interventions in the flowsheet. Outcome: Progressing Towards Goal  Note: Fall Risk Interventions:  Mobility Interventions: Bed/chair exit alarm    Mentation Interventions: Adequate sleep, hydration, pain control    Medication Interventions: Bed/chair exit alarm    Elimination Interventions: Bed/chair exit alarm, Call light in reach              Problem: Patient Education: Go to Patient Education Activity  Goal: Patient/Family Education  Outcome: Progressing Towards Goal     Problem: Pressure Injury - Risk of  Goal: *Prevention of pressure injury  Description: Document Lino Scale and appropriate interventions in the flowsheet.   Outcome: Progressing Towards Goal  Note: Pressure Injury Interventions:  Sensory Interventions: Assess changes in LOC    Moisture Interventions: Minimize layers    Activity Interventions: PT/OT evaluation    Mobility Interventions: Float heels    Nutrition Interventions: Document food/fluid/supplement intake    Friction and Shear Interventions: Apply protective barrier, creams and emollients                Problem: Patient Education: Go to Patient Education Activity  Goal: Patient/Family Education  Outcome: Progressing Towards Goal     Problem: Patient Education: Go to Patient Education Activity  Goal: Patient/Family Education  Outcome: Progressing Towards Goal     Problem: Patient Education: Go to Patient Education Activity  Goal: Patient/Family Education  Outcome: Progressing Towards Goal

## 2022-12-19 NOTE — PROGRESS NOTES
..Discharge plan of care/case management plan validated with provider discharge order. Pt. Being discharged to [facility] with [address] [place] [address]. Discharge paperwork was given to . Report was called to [facility] and message was left to return call.

## 2022-12-21 LAB
BACTERIA SPEC CULT: NORMAL
BACTERIA SPEC CULT: NORMAL
SPECIAL REQUESTS,SREQ: NORMAL
SPECIAL REQUESTS,SREQ: NORMAL

## 2022-12-22 LAB
BACTERIA SPEC CULT: NORMAL
SPECIAL REQUESTS,SREQ: NORMAL

## 2023-01-14 PROBLEM — N39.0 UTI (URINARY TRACT INFECTION): Status: RESOLVED | Noted: 2022-12-15 | Resolved: 2023-01-14

## 2023-04-25 ENCOUNTER — APPOINTMENT (OUTPATIENT)
Dept: CT IMAGING | Age: 88
End: 2023-04-25
Attending: EMERGENCY MEDICINE
Payer: MEDICARE

## 2023-04-25 ENCOUNTER — HOSPITAL ENCOUNTER (INPATIENT)
Age: 88
LOS: 4 days | Discharge: NURSING, REHAB OR LONG-TERM CARE | End: 2023-04-29
Attending: EMERGENCY MEDICINE | Admitting: FAMILY MEDICINE
Payer: MEDICARE

## 2023-04-25 DIAGNOSIS — K92.2 LOWER GI BLEED: Primary | ICD-10-CM

## 2023-04-25 LAB
ABO + RH BLD: NORMAL
ALBUMIN SERPL-MCNC: 3.3 G/DL (ref 3.5–5)
ALBUMIN/GLOB SERPL: 0.7 (ref 1.1–2.2)
ALP SERPL-CCNC: 93 U/L (ref 45–117)
ALT SERPL-CCNC: 22 U/L (ref 12–78)
ANION GAP SERPL CALC-SCNC: 5 MMOL/L (ref 5–15)
AST SERPL W P-5'-P-CCNC: 15 U/L (ref 15–37)
BASOPHILS # BLD: 0.1 K/UL (ref 0–0.1)
BASOPHILS NFR BLD: 0 % (ref 0–1)
BILIRUB SERPL-MCNC: 0.4 MG/DL (ref 0.2–1)
BLOOD GROUP ANTIBODIES SERPL: NEGATIVE
BUN SERPL-MCNC: 18 MG/DL (ref 6–20)
BUN/CREAT SERPL: 22 (ref 12–20)
CA-I BLD-MCNC: 9.7 MG/DL (ref 8.5–10.1)
CHLORIDE SERPL-SCNC: 102 MMOL/L (ref 97–108)
CO2 SERPL-SCNC: 30 MMOL/L (ref 21–32)
CREAT SERPL-MCNC: 0.83 MG/DL (ref 0.55–1.02)
DIFFERENTIAL METHOD BLD: ABNORMAL
EOSINOPHIL # BLD: 0.2 K/UL (ref 0–0.4)
EOSINOPHIL NFR BLD: 2 % (ref 0–7)
ERYTHROCYTE [DISTWIDTH] IN BLOOD BY AUTOMATED COUNT: 13.3 % (ref 11.5–14.5)
ERYTHROCYTE [DISTWIDTH] IN BLOOD BY AUTOMATED COUNT: 13.3 % (ref 11.5–14.5)
GLOBULIN SER CALC-MCNC: 4.5 G/DL (ref 2–4)
GLUCOSE SERPL-MCNC: 135 MG/DL (ref 65–100)
HCT VFR BLD AUTO: 38.7 % (ref 35–47)
HCT VFR BLD AUTO: 40.6 % (ref 35–47)
HGB BLD-MCNC: 12.7 G/DL (ref 11.5–16)
HGB BLD-MCNC: 13.2 G/DL (ref 11.5–16)
IMM GRANULOCYTES # BLD AUTO: 0 K/UL (ref 0–0.04)
IMM GRANULOCYTES NFR BLD AUTO: 0 % (ref 0–0.5)
INR PPP: 1.1 (ref 0.9–1.1)
LYMPHOCYTES # BLD: 2.2 K/UL (ref 0.8–3.5)
LYMPHOCYTES NFR BLD: 20 % (ref 12–49)
MCH RBC QN AUTO: 29.7 PG (ref 26–34)
MCH RBC QN AUTO: 30.2 PG (ref 26–34)
MCHC RBC AUTO-ENTMCNC: 32.5 G/DL (ref 30–36.5)
MCHC RBC AUTO-ENTMCNC: 32.8 G/DL (ref 30–36.5)
MCV RBC AUTO: 91.2 FL (ref 80–99)
MCV RBC AUTO: 91.9 FL (ref 80–99)
MONOCYTES # BLD: 0.9 K/UL (ref 0–1)
MONOCYTES NFR BLD: 8 % (ref 5–13)
NEUTS SEG # BLD: 7.8 K/UL (ref 1.8–8)
NEUTS SEG NFR BLD: 70 % (ref 32–75)
NRBC # BLD: 0 K/UL (ref 0–0.01)
NRBC # BLD: 0 K/UL (ref 0–0.01)
NRBC BLD-RTO: 0 PER 100 WBC
NRBC BLD-RTO: 0 PER 100 WBC
PLATELET # BLD AUTO: 327 K/UL (ref 150–400)
PLATELET # BLD AUTO: 330 K/UL (ref 150–400)
PMV BLD AUTO: 9.7 FL (ref 8.9–12.9)
PMV BLD AUTO: 9.8 FL (ref 8.9–12.9)
POTASSIUM SERPL-SCNC: 3.8 MMOL/L (ref 3.5–5.1)
PROT SERPL-MCNC: 7.8 G/DL (ref 6.4–8.2)
PROTHROMBIN TIME: 14.7 SEC (ref 11.9–14.6)
RBC # BLD AUTO: 4.21 M/UL (ref 3.8–5.2)
RBC # BLD AUTO: 4.45 M/UL (ref 3.8–5.2)
SODIUM SERPL-SCNC: 137 MMOL/L (ref 136–145)
SPECIMEN EXP DATE BLD: NORMAL
WBC # BLD AUTO: 11.2 K/UL (ref 3.6–11)
WBC # BLD AUTO: 12.5 K/UL (ref 3.6–11)

## 2023-04-25 PROCEDURE — 85025 COMPLETE CBC W/AUTO DIFF WBC: CPT

## 2023-04-25 PROCEDURE — 74178 CT ABD&PLV WO CNTR FLWD CNTR: CPT

## 2023-04-25 PROCEDURE — 36415 COLL VENOUS BLD VENIPUNCTURE: CPT

## 2023-04-25 PROCEDURE — 93005 ELECTROCARDIOGRAM TRACING: CPT

## 2023-04-25 PROCEDURE — 86900 BLOOD TYPING SEROLOGIC ABO: CPT

## 2023-04-25 PROCEDURE — 74011000636 HC RX REV CODE- 636: Performed by: FAMILY MEDICINE

## 2023-04-25 PROCEDURE — 85610 PROTHROMBIN TIME: CPT

## 2023-04-25 PROCEDURE — 74011000250 HC RX REV CODE- 250: Performed by: FAMILY MEDICINE

## 2023-04-25 PROCEDURE — 85027 COMPLETE CBC AUTOMATED: CPT

## 2023-04-25 PROCEDURE — C9113 INJ PANTOPRAZOLE SODIUM, VIA: HCPCS | Performed by: FAMILY MEDICINE

## 2023-04-25 PROCEDURE — 74011250637 HC RX REV CODE- 250/637: Performed by: FAMILY MEDICINE

## 2023-04-25 PROCEDURE — 65270000029 HC RM PRIVATE

## 2023-04-25 PROCEDURE — 80053 COMPREHEN METABOLIC PANEL: CPT

## 2023-04-25 PROCEDURE — 74011250636 HC RX REV CODE- 250/636: Performed by: FAMILY MEDICINE

## 2023-04-25 PROCEDURE — 99285 EMERGENCY DEPT VISIT HI MDM: CPT

## 2023-04-25 RX ORDER — ATORVASTATIN CALCIUM 40 MG/1
40 TABLET, FILM COATED ORAL
COMMUNITY

## 2023-04-25 RX ORDER — MELATONIN
2000 DAILY
COMMUNITY

## 2023-04-25 RX ORDER — ACETAMINOPHEN 325 MG/1
650 TABLET ORAL 2 TIMES DAILY
COMMUNITY
End: 2023-04-29

## 2023-04-25 RX ORDER — ONDANSETRON 2 MG/ML
4 INJECTION INTRAMUSCULAR; INTRAVENOUS
Status: DISCONTINUED | OUTPATIENT
Start: 2023-04-25 | End: 2023-04-29 | Stop reason: HOSPADM

## 2023-04-25 RX ORDER — PAROXETINE HYDROCHLORIDE HEMIHYDRATE 12.5 MG/1
12.5 TABLET, FILM COATED, EXTENDED RELEASE ORAL DAILY
COMMUNITY

## 2023-04-25 RX ORDER — ACETAMINOPHEN 325 MG/1
650 TABLET ORAL
Status: DISCONTINUED | OUTPATIENT
Start: 2023-04-25 | End: 2023-04-29 | Stop reason: HOSPADM

## 2023-04-25 RX ORDER — OMEPRAZOLE 20 MG/1
40 CAPSULE, DELAYED RELEASE ORAL DAILY
COMMUNITY

## 2023-04-25 RX ORDER — POLYETHYLENE GLYCOL 3350 17 G/17G
17 POWDER, FOR SOLUTION ORAL DAILY PRN
Status: DISCONTINUED | OUTPATIENT
Start: 2023-04-25 | End: 2023-04-29 | Stop reason: HOSPADM

## 2023-04-25 RX ORDER — PETROLATUM 42 G/100G
OINTMENT TOPICAL DAILY
COMMUNITY
End: 2023-04-29

## 2023-04-25 RX ORDER — ACETAMINOPHEN 650 MG/1
650 SUPPOSITORY RECTAL
Status: DISCONTINUED | OUTPATIENT
Start: 2023-04-25 | End: 2023-04-29 | Stop reason: HOSPADM

## 2023-04-25 RX ORDER — DOCUSATE SODIUM 100 MG/1
100 CAPSULE, LIQUID FILLED ORAL
COMMUNITY
End: 2023-04-29

## 2023-04-25 RX ORDER — SODIUM CHLORIDE 9 MG/ML
75 INJECTION, SOLUTION INTRAVENOUS CONTINUOUS
Status: DISCONTINUED | OUTPATIENT
Start: 2023-04-25 | End: 2023-04-29 | Stop reason: HOSPADM

## 2023-04-25 RX ORDER — ONDANSETRON 4 MG/1
4 TABLET, ORALLY DISINTEGRATING ORAL
Status: DISCONTINUED | OUTPATIENT
Start: 2023-04-25 | End: 2023-04-29 | Stop reason: HOSPADM

## 2023-04-25 RX ORDER — POTASSIUM CHLORIDE 1500 MG/1
20 TABLET, FILM COATED, EXTENDED RELEASE ORAL DAILY
COMMUNITY
End: 2023-04-29

## 2023-04-25 RX ORDER — METOPROLOL TARTRATE 50 MG/1
50 TABLET ORAL EVERY 12 HOURS
COMMUNITY
End: 2023-04-29

## 2023-04-25 RX ORDER — TRAZODONE HYDROCHLORIDE 50 MG/1
25 TABLET ORAL
COMMUNITY

## 2023-04-25 RX ORDER — SENNOSIDES 8.6 MG/1
1 TABLET ORAL
COMMUNITY
End: 2023-04-29

## 2023-04-25 RX ORDER — GUAIFENESIN 100 MG/5ML
81 LIQUID (ML) ORAL DAILY
COMMUNITY

## 2023-04-25 RX ORDER — SPIRONOLACTONE 25 MG/1
25 TABLET ORAL DAILY
COMMUNITY

## 2023-04-25 RX ORDER — ISOSORBIDE MONONITRATE 30 MG/1
30 TABLET, EXTENDED RELEASE ORAL DAILY
COMMUNITY

## 2023-04-25 RX ADMIN — IOPAMIDOL 100 ML: 755 INJECTION, SOLUTION INTRAVENOUS at 16:32

## 2023-04-25 RX ADMIN — ACETAMINOPHEN 650 MG: 325 TABLET ORAL at 16:33

## 2023-04-25 RX ADMIN — SODIUM CHLORIDE 75 ML/HR: 9 INJECTION, SOLUTION INTRAVENOUS at 17:56

## 2023-04-25 RX ADMIN — SODIUM CHLORIDE 1000 ML: 9 INJECTION, SOLUTION INTRAVENOUS at 16:41

## 2023-04-25 RX ADMIN — SODIUM CHLORIDE, PRESERVATIVE FREE 40 MG: 5 INJECTION INTRAVENOUS at 16:37

## 2023-04-25 NOTE — ED NOTES
Patient agitated and yelling in room. Her son, Aleta Eagle [employer] [facility]), called for an update. He does not know if he is going to come to the hospital because he thinks he may agitate her more.

## 2023-04-25 NOTE — ED PROVIDER NOTES
[facility] EMERGENCY DEPT  EMERGENCY DEPARTMENT HISTORY AND PHYSICAL EXAM      Date: 4/25/2023  Patient Name: Tejinder Brizuela  MRN: [id number]  [place] 4/2/1932  Date of evaluation: 4/25/2023  Provider: Agustin Albarado MD   Note Started: 2:41 PM 4/25/23    HISTORY OF PRESENT ILLNESS     Chief Complaint   Patient presents with    Rectal Bleeding       History Provided By: Patient, ems    HPI: Tejinder Brizuela is a 80 y.o. female with a history of dementia, presenting for bright red blood per rectum. Patient lives at an assisted living home and was fine today and then when they went to change her noticed that she had lots of bright red blood per rectum. Patient states that she thinks is coming out of her vagina and has a history of a total hysterectomy. Denies any abdominal pain, nausea, vomiting, lightheadedness. Has been on baby aspirin at baseline    PAST MEDICAL HISTORY   Past Medical History:  No past medical history on file. Past Surgical History:  No past surgical history on file. Family History:  No family history on file. Social History: Allergies:  No Known Allergies    PCP: None    Current Meds:   Previous Medications    No medications on file       PHYSICAL EXAM     ED Triage Vitals   ED Encounter Vitals Group      BP       Pulse       Resp       Temp       Temp src       SpO2       Weight       Height       Physical Exam  Constitutional:       General: She is not in acute distress. Appearance: She is well-developed. HENT:      Head: Normocephalic and atraumatic. Nose: Nose normal.      Mouth/Throat:      Mouth: Mucous membranes are moist.   Eyes:      Extraocular Movements: Extraocular movements intact. Conjunctiva/sclera: Conjunctivae normal.   Cardiovascular:      Comments: Well perfused  Pulmonary:      Effort: Pulmonary effort is normal. No respiratory distress. Abdominal:      General: Abdomen is flat. Bowel sounds are normal. There is no distension. Tenderness:  There is no abdominal tenderness. Comments: Diaper has lots of BRBPR. Not from vagina. Musculoskeletal:         General: Normal range of motion. Cervical back: Normal range of motion. Neurological:      General: No focal deficit present. Mental Status: She is alert and oriented to person, place, and time. Psychiatric:         Mood and Affect: Mood normal.         SCREENINGS              LAB, EKG AND DIAGNOSTIC RESULTS   Labs:  Recent Results (from the past 12 hour(s))   CBC WITH AUTOMATED DIFF    Collection Time: 04/25/23  2:55 PM   Result Value Ref Range    WBC 11.2 (H) 3.6 - 11.0 K/uL    RBC 4.45 3.80 - 5.20 M/uL    HGB 13.2 11.5 - 16.0 g/dL    HCT 40.6 35.0 - 47.0 %    MCV 91.2 80.0 - 99.0 FL    MCH 29.7 26.0 - 34.0 PG    MCHC 32.5 30.0 - 36.5 g/dL    RDW 13.3 11.5 - 14.5 %    PLATELET 382 605 - 775 K/uL    MPV 9.8 8.9 - 12.9 FL    NRBC 0.0 0.0  WBC    ABSOLUTE NRBC 0.00 0.00 - 0.01 K/uL    NEUTROPHILS 70 32 - 75 %    LYMPHOCYTES 20 12 - 49 %    MONOCYTES 8 5 - 13 %    EOSINOPHILS 2 0 - 7 %    BASOPHILS 0 0 - 1 %    IMMATURE GRANULOCYTES 0 0 - 0.5 %    ABS. NEUTROPHILS 7.8 1.8 - 8.0 K/UL    ABS. LYMPHOCYTES 2.2 0.8 - 3.5 K/UL    ABS. MONOCYTES 0.9 0.0 - 1.0 K/UL    ABS. EOSINOPHILS 0.2 0.0 - 0.4 K/UL    ABS. BASOPHILS 0.1 0.0 - 0.1 K/UL    ABS. IMM. GRANS. 0.0 0.00 - 0.04 K/UL    DF AUTOMATED     METABOLIC PANEL, COMPREHENSIVE    Collection Time: 04/25/23  2:55 PM   Result Value Ref Range    Sodium 137 136 - 145 mmol/L    Potassium 3.8 3.5 - 5.1 mmol/L    Chloride 102 97 - 108 mmol/L    CO2 30 21 - 32 mmol/L    Anion gap 5 5 - 15 mmol/L    Glucose 135 (H) 65 - 100 mg/dL    BUN 18 6 - 20 mg/dL    Creatinine 0.83 0.55 - 1.02 mg/dL    BUN/Creatinine ratio 22 (H) 12 - 20      eGFR >60 >60 ml/min/1.73m2    Calcium 9.7 8.5 - 10.1 mg/dL    Bilirubin, total 0.4 0.2 - 1.0 mg/dL    AST (SGOT) 15 15 - 37 U/L    ALT (SGPT) 22 12 - 78 U/L    Alk.  phosphatase 93 45 - 117 U/L    Protein, total 7.8 6.4 - 8.2 g/dL    Albumin 3.3 (L) 3.5 - 5.0 g/dL    Globulin 4.5 (H) 2.0 - 4.0 g/dL    A-G Ratio 0.7 (L) 1.1 - 2.2     PROTHROMBIN TIME + INR    Collection Time: 04/25/23  2:55 PM   Result Value Ref Range    Prothrombin time 14.7 (H) 11.9 - 14.6 sec    INR 1.1 0.9 - 1.1         EKG: Initial EKG interpreted by me. See ED Course Below    Radiologic Studies:  Non-plain film images such as CT, Ultrasound and MRI are read by the radiologist. Plain radiographic images are visualized and preliminarily interpreted by the ED Provider with the following findings: Not Applicable    Interpretation per the Radiologist below, if available at the time of this note:  No results found. PROCEDURES   Unless otherwise noted below, none  Performed by: Renato Reyes MD   Procedures      CRITICAL CARE TIME   Patient does not meet Critical Care Time, 0 minutes    ED COURSE and DIFFERENTIAL DIAGNOSIS/MDM   CC/HPI/PE Summary, DDx: Patient presents with lower GI bleeding. DDx: AVM, diverticulosis, diverticulitis, IBD, IBS, colitis, hemorrhoids. Will get labs, T+S, keep on monitor and give fluids to start if prior EF% allows. Will also get orthostatics PRN.        Records Reviewed (source and summary of external notes): Prior medical records and Nursing notes    Vitals:    Vitals:    04/25/23 1449 04/25/23 1504 04/25/23 1519 04/25/23 1525   BP: 135/63 114/60     Pulse: 72 74 63    Resp: 19 19 20    SpO2: 95% 100% 100% 100%   Weight: 81.6 kg (180 lb)      Height: 5' 6\" (1.676 m)           ED COURSE       Disposition Considerations (Tests not done, Shared Decision Making, Pt Expectation of Test or Treatment.): Not Applicable    Patient was given the following medications:  Medications   sodium chloride 0.9 % bolus infusion 1,000 mL (has no administration in time range)       CONSULTS: (Who and What was discussed)  IP CONSULT TO GASTROENTEROLOGY     Social Determinants affecting Dx or Tx: None    Smoking Cessation: Not Applicable    ED FINAL IMPRESSION     1. Lower GI bleed          DISPOSITION/PLAN   Admitted    Admit Note: Pt is being admitted by [facility]. The results of their tests and reason(s) for their admission have been discussed with pt and/or available family. They convey agreement and understanding for the need to be admitted and for the admission diagnosis. PATIENT REFERRED TO:  Follow-up Information    None           DISCHARGE MEDICATIONS:  There are no discharge medications for this patient. DISCONTINUED MEDICATIONS:  There are no discharge medications for this patient. I am the Primary Clinician of Record. Jere Martinez MD (electronically signed)    (Please note that parts of this dictation were completed with voice recognition software. Quite often unanticipated grammatical, syntax, homophones, and other interpretive errors are inadvertently transcribed by the computer software. Please disregards these errors.  Please excuse any errors that have escaped final proofreading.)

## 2023-04-25 NOTE — H&P
History and Physical    NAME: Juan Martinez   :  1932   MRN:  [id number]     Date/Time:  2023 3:53 PM    Patient PCP: None  ______________________________________________________________________             Subjective:     CHIEF COMPLAINT:     Rectal bleed        HISTORY OF PRESENT ILLNESS:       Patient is a 80y.o. year old female history of advanced dementia anxiety and depression and emergency room because of rectal bleed  And living at the assisted living facility nurse went to change her diaper found a lot of bright red blood came to emergency room seen by the ER physician recommended patient to be admitted for GI bleed GI was consulted by the ER physician patient has no nausea vomiting diarrhea no constipation no fever no chills    No past medical history on file. No past surgical history on file. Social History     Tobacco Use    Smoking status: Not on file    Smokeless tobacco: Not on file   Substance Use Topics    Alcohol use: Not on file        No family history on file. No Known Allergies     Prior to Admission medications    Not on File         Current Facility-Administered Medications:     sodium chloride 0.9 % bolus infusion 1,000 mL, 1,000 mL, IntraVENous, NOW, Hollie Galan MD    0.9% sodium chloride infusion, 75 mL/hr, IntraVENous, CONTINUOUS, Hollie Galan MD    acetaminophen (TYLENOL) tablet 650 mg, 650 mg, Oral, Q6H PRN **OR** acetaminophen (TYLENOL) suppository 650 mg, 650 mg, Rectal, Q6H PRN, Alem Galan MD    polyethylene glycol (MIRALAX) packet 17 g, 17 g, Oral, DAILY PRN, Alem Galan MD    ondansetron (ZOFRAN ODT) tablet 4 mg, 4 mg, Oral, Q8H PRN **OR** ondansetron (ZOFRAN) injection 4 mg, 4 mg, IntraVENous, Q6H PRN, Alem Galan MD    [START ON 2023] pantoprazole (PROTONIX) 40 mg in 0.9% sodium chloride 10 mL injection, 40 mg, IntraVENous, DAILY, Hollie Galan MD  No current outpatient medications on file.     LAB DATA REVIEWED:    Recent Results (from the past 24 hour(s))   CBC WITH AUTOMATED DIFF    Collection Time: 04/25/23  2:55 PM   Result Value Ref Range    WBC 11.2 (H) 3.6 - 11.0 K/uL    RBC 4.45 3.80 - 5.20 M/uL    HGB 13.2 11.5 - 16.0 g/dL    HCT 40.6 35.0 - 47.0 %    MCV 91.2 80.0 - 99.0 FL    MCH 29.7 26.0 - 34.0 PG    MCHC 32.5 30.0 - 36.5 g/dL    RDW 13.3 11.5 - 14.5 %    PLATELET 007 516 - 400 K/uL    MPV 9.8 8.9 - 12.9 FL    NRBC 0.0 0.0  WBC    ABSOLUTE NRBC 0.00 0.00 - 0.01 K/uL    NEUTROPHILS 70 32 - 75 %    LYMPHOCYTES 20 12 - 49 %    MONOCYTES 8 5 - 13 %    EOSINOPHILS 2 0 - 7 %    BASOPHILS 0 0 - 1 %    IMMATURE GRANULOCYTES 0 0 - 0.5 %    ABS. NEUTROPHILS 7.8 1.8 - 8.0 K/UL    ABS. LYMPHOCYTES 2.2 0.8 - 3.5 K/UL    ABS. MONOCYTES 0.9 0.0 - 1.0 K/UL    ABS. EOSINOPHILS 0.2 0.0 - 0.4 K/UL    ABS. BASOPHILS 0.1 0.0 - 0.1 K/UL    ABS. IMM. GRANS. 0.0 0.00 - 0.04 K/UL    DF AUTOMATED     METABOLIC PANEL, COMPREHENSIVE    Collection Time: 04/25/23  2:55 PM   Result Value Ref Range    Sodium 137 136 - 145 mmol/L    Potassium 3.8 3.5 - 5.1 mmol/L    Chloride 102 97 - 108 mmol/L    CO2 30 21 - 32 mmol/L    Anion gap 5 5 - 15 mmol/L    Glucose 135 (H) 65 - 100 mg/dL    BUN 18 6 - 20 mg/dL    Creatinine 0.83 0.55 - 1.02 mg/dL    BUN/Creatinine ratio 22 (H) 12 - 20      eGFR >60 >60 ml/min/1.73m2    Calcium 9.7 8.5 - 10.1 mg/dL    Bilirubin, total 0.4 0.2 - 1.0 mg/dL    AST (SGOT) 15 15 - 37 U/L    ALT (SGPT) 22 12 - 78 U/L    Alk.  phosphatase 93 45 - 117 U/L    Protein, total 7.8 6.4 - 8.2 g/dL    Albumin 3.3 (L) 3.5 - 5.0 g/dL    Globulin 4.5 (H) 2.0 - 4.0 g/dL    A-G Ratio 0.7 (L) 1.1 - 2.2     PROTHROMBIN TIME + INR    Collection Time: 04/25/23  2:55 PM   Result Value Ref Range    Prothrombin time 14.7 (H) 11.9 - 14.6 sec    INR 1.1 0.9 - 1.1         XR Results (most recent):  Results from Hospital Encounter encounter on 12/15/22    XR CHEST PORT    Narrative  EXAM:  XR CHEST PORT    INDICATION: Weakness    COMPARISON: none    TECHNIQUE: portable chest AP view    FINDINGS: The cardiac silhouette is within normal limits. The pulmonary  vasculature is within normal limits. There is a line of discoid atelectasis or scarring in the right lower lobe. The  lungs and pleural spaces are otherwise clear. The bones are osteopenic. Impression  No acute process on portable chest.         CT ABD PELV W CONT    (Results Pending)        Review of Systems:  Constitutional: Negative for chills and fever. HENT: Negative. Eyes: Negative. Respiratory: Negative. Cardiovascular: Negative. Gastrointestinal: Negative for abdominal pain and nausea. Skin: Negative. Neurological: Negative. Objective:   VITALS:    Visit Vitals  /60   Pulse 63   Resp 20   Ht 5' 6\" (1.676 m)   Wt 81.6 kg (180 lb)   SpO2 100%   BMI 29.05 kg/m²       Physical Exam:   Constitutional: Alert awake  HENT:   Head: Normocephalic and atraumatic. Eyes: Pupils are equal, round, and reactive to light. EOM are normal.   Cardiovascular: Normal rate, regular rhythm and normal heart sounds. Pulmonary/Chest: Breath sounds normal. No wheezes. No rales. Exhibits no tenderness. Abdominal: Soft. Bowel sounds are normal. There is no abdominal tenderness. There is no rebound and no guarding. Musculoskeletal: Normal range of motion.    Neurological: Alert awake  ASSESSMENT & PLAN:      Acute GI bleed  Rectal bleed  Advanced dementia      Admit to medical telemetry floor  Protonix 40 daily  IV fluid  GI consult  CT scan of the abdomen pelvis pending      Current Facility-Administered Medications:     sodium chloride 0.9 % bolus infusion 1,000 mL, 1,000 mL, IntraVENous, NOW, Alem Galan MD    0.9% sodium chloride infusion, 75 mL/hr, IntraVENous, CONTINUOUS, Alem Galan MD    acetaminophen (TYLENOL) tablet 650 mg, 650 mg, Oral, Q6H PRN **OR** acetaminophen (TYLENOL) suppository 650 mg, 650 mg, Rectal, Q6H PRN, Mohiuddin, Derryl Boast, MD    polyethylene glycol (MIRALAX) packet 17 g, 17 g, Oral, DAILY PRN, Uma Galan MD    ondansetron (ZOFRAN ODT) tablet 4 mg, 4 mg, Oral, Q8H PRN **OR** ondansetron (ZOFRAN) injection 4 mg, 4 mg, IntraVENous, Q6H PRN, Alem Galan MD    [START ON 4/26/2023] pantoprazole (PROTONIX) 40 mg in 0.9% sodium chloride 10 mL injection, 40 mg, IntraVENous, DAILY, Uma Galan MD  No current outpatient medications on file.     ________________________________________________________________________    Signed: Pearl Davis MD

## 2023-04-25 NOTE — ED TRIAGE NOTES
Patient arrives via EMS from assisted living. Assisted living facility found bright blood in her stool while changing her. She is a bit agitated and has a history of of dementia per EMS. Stomach is soft and not distended on arrival. She takes Aspirin.

## 2023-04-25 NOTE — PROGRESS NOTES
Reason for Admission:  GIB                     RUR Score:  8%                   Plan for utilizing home health: None. Son Juliana Shannon) verbally consented to Choice Letter via phone for pt to return to [facility]. Referral sent via Jolanta Siegel. PCP: First and Last name:  Stef Saucedo      Name of Practice:    Are you a current patient: Yes/No: Yes   Approximate date of last visit: Seen @ the facility. Can you participate in a virtual visit with your PCP: No                    Current Advanced Directive/Advance Care Plan: Full Code      Healthcare Decision Maker:              Primary Decision Maker: Teddy Jurado - [phone]                  Transition of Care Plan:                    D/C Plan is to return to [facility] upon discharge via transportation.

## 2023-04-25 NOTE — ACP (ADVANCE CARE PLANNING)
Advance Care Planning   Healthcare Decision Maker:       Primary Decision Maker: Jeraldene Boxer - [phone]

## 2023-04-25 NOTE — PROGRESS NOTES
Admission Medication Reconciliation:    Information obtained from:  Transfer papers   RxQuery data available¹:  NO    Comments/Recommendations: Updated PTA meds/reviewed patient's allergies. 1)  Patient resides at [facility]    2)  Medication changes (since last review): Added  - All medications in chart were newly added    Adjusted  - N/A    Removed  - N/A     ¹RxQuery pharmacy benefit data reflects medications filled and processed through the patient's insurance, however   this data does NOT capture whether the medication was picked up or is currently being taken by the patient. Prior to Admission Medications   Prescriptions Last Dose Informant Taking? PARoxetine CR (PAXIL-CR) 12.5 mg tablet  Transfer Papers Yes   Sig: Take 1 Tablet by mouth daily. Senna 8.6 mg tablet  Transfer Papers Yes   Sig: Take 1 Tablet by mouth nightly. Indications: constipation   acetaminophen (TYLENOL) 325 mg tablet  Transfer Papers Yes   Sig: Take 2 Tablets by mouth two (2) times a day. Indications: fever, pain   aspirin 81 mg chewable tablet  Transfer Papers Yes   Sig: Take 1 Tablet by mouth daily. atorvastatin (LIPITOR) 40 mg tablet  Transfer Papers Yes   Sig: Take 1 Tablet by mouth nightly. cholecalciferol (Vitamin D3) (1000 Units /25 mcg) tablet  Transfer Papers Yes   Sig: Take 2 Tablets by mouth daily. docusate sodium (Colace) 100 mg capsule  Transfer Papers Yes   Sig: Take 1 Capsule by mouth nightly. Indications: constipation   isosorbide mononitrate ER (IMDUR) 30 mg tablet  Transfer Papers Yes   Sig: Take 1 Tablet by mouth daily. metoprolol tartrate (LOPRESSOR) 50 mg tablet  Transfer Papers Yes   Sig: Take 1 Tablet by mouth every twelve (12) hours. mineral oil-hydrophil petrolat (Aquaphor) ointment  Transfer Papers Yes   Sig: Apply  to affected area daily. Apply to each lower extremity   omeprazole (PRILOSEC) 20 mg capsule  Transfer Papers Yes   Sig: Take 2 Capsules by mouth daily.    potassium chloride SA (KLOR-CON M15) 15 mEq tablet  Transfer Papers Yes   Sig: Take 1 Tablet by mouth daily. spironolactone (ALDACTONE) 25 mg tablet  Transfer Papers Yes   Sig: Take 1 Tablet by mouth daily. traZODone (DESYREL) 50 mg tablet  Transfer Papers Yes   Sig: Take 0.5 Tablets by mouth nightly.       Facility-Administered Medications: None

## 2023-04-26 LAB
ALBUMIN SERPL-MCNC: 2.7 G/DL (ref 3.5–5)
ALBUMIN/GLOB SERPL: 0.8 (ref 1.1–2.2)
ALP SERPL-CCNC: 69 U/L (ref 45–117)
ALT SERPL-CCNC: 17 U/L (ref 12–78)
ANION GAP SERPL CALC-SCNC: 3 MMOL/L (ref 5–15)
AST SERPL W P-5'-P-CCNC: 11 U/L (ref 15–37)
ATRIAL RATE: 46 BPM
BASOPHILS # BLD: 0.1 K/UL (ref 0–0.1)
BASOPHILS NFR BLD: 1 % (ref 0–1)
BILIRUB SERPL-MCNC: 0.4 MG/DL (ref 0.2–1)
BUN SERPL-MCNC: 13 MG/DL (ref 6–20)
BUN/CREAT SERPL: 23 (ref 12–20)
CA-I BLD-MCNC: 8.6 MG/DL (ref 8.5–10.1)
CALCULATED R AXIS, ECG10: 4 DEGREES
CALCULATED T AXIS, ECG11: -165 DEGREES
CHLORIDE SERPL-SCNC: 109 MMOL/L (ref 97–108)
CO2 SERPL-SCNC: 28 MMOL/L (ref 21–32)
CREAT SERPL-MCNC: 0.57 MG/DL (ref 0.55–1.02)
DIAGNOSIS, 93000: NORMAL
DIFFERENTIAL METHOD BLD: ABNORMAL
EOSINOPHIL # BLD: 0.3 K/UL (ref 0–0.4)
EOSINOPHIL NFR BLD: 4 % (ref 0–7)
ERYTHROCYTE [DISTWIDTH] IN BLOOD BY AUTOMATED COUNT: 13.3 % (ref 11.5–14.5)
GLOBULIN SER CALC-MCNC: 3.3 G/DL (ref 2–4)
GLUCOSE SERPL-MCNC: 95 MG/DL (ref 65–100)
HCT VFR BLD AUTO: 33.1 % (ref 35–47)
HGB BLD-MCNC: 11 G/DL (ref 11.5–16)
IMM GRANULOCYTES # BLD AUTO: 0 K/UL (ref 0–0.04)
IMM GRANULOCYTES NFR BLD AUTO: 0 % (ref 0–0.5)
LYMPHOCYTES # BLD: 2 K/UL (ref 0.8–3.5)
LYMPHOCYTES NFR BLD: 25 % (ref 12–49)
MCH RBC QN AUTO: 30.6 PG (ref 26–34)
MCHC RBC AUTO-ENTMCNC: 33.2 G/DL (ref 30–36.5)
MCV RBC AUTO: 91.9 FL (ref 80–99)
MONOCYTES # BLD: 0.9 K/UL (ref 0–1)
MONOCYTES NFR BLD: 11 % (ref 5–13)
NEUTS SEG # BLD: 4.9 K/UL (ref 1.8–8)
NEUTS SEG NFR BLD: 59 % (ref 32–75)
NRBC # BLD: 0 K/UL (ref 0–0.01)
NRBC BLD-RTO: 0 PER 100 WBC
PLATELET # BLD AUTO: 251 K/UL (ref 150–400)
PMV BLD AUTO: 9.7 FL (ref 8.9–12.9)
POTASSIUM SERPL-SCNC: 3.7 MMOL/L (ref 3.5–5.1)
PROT SERPL-MCNC: 6 G/DL (ref 6.4–8.2)
Q-T INTERVAL, ECG07: 472 MS
QRS DURATION, ECG06: 86 MS
QTC CALCULATION (BEZET), ECG08: 442 MS
RBC # BLD AUTO: 3.6 M/UL (ref 3.8–5.2)
SODIUM SERPL-SCNC: 140 MMOL/L (ref 136–145)
VENTRICULAR RATE, ECG03: 53 BPM
WBC # BLD AUTO: 8.2 K/UL (ref 3.6–11)

## 2023-04-26 PROCEDURE — 80053 COMPREHEN METABOLIC PANEL: CPT

## 2023-04-26 PROCEDURE — 85025 COMPLETE CBC W/AUTO DIFF WBC: CPT

## 2023-04-26 PROCEDURE — 74011000250 HC RX REV CODE- 250: Performed by: FAMILY MEDICINE

## 2023-04-26 PROCEDURE — C9113 INJ PANTOPRAZOLE SODIUM, VIA: HCPCS | Performed by: FAMILY MEDICINE

## 2023-04-26 PROCEDURE — 74011250636 HC RX REV CODE- 250/636: Performed by: FAMILY MEDICINE

## 2023-04-26 PROCEDURE — 74011250637 HC RX REV CODE- 250/637: Performed by: FAMILY MEDICINE

## 2023-04-26 PROCEDURE — 36415 COLL VENOUS BLD VENIPUNCTURE: CPT

## 2023-04-26 PROCEDURE — 65270000029 HC RM PRIVATE

## 2023-04-26 RX ADMIN — ACETAMINOPHEN 650 MG: 325 TABLET ORAL at 15:56

## 2023-04-26 RX ADMIN — SODIUM CHLORIDE 75 ML/HR: 9 INJECTION, SOLUTION INTRAVENOUS at 08:04

## 2023-04-26 RX ADMIN — SODIUM CHLORIDE, PRESERVATIVE FREE 40 MG: 5 INJECTION INTRAVENOUS at 08:00

## 2023-04-26 RX ADMIN — SODIUM CHLORIDE 75 ML/HR: 9 INJECTION, SOLUTION INTRAVENOUS at 22:47

## 2023-04-26 NOTE — PROGRESS NOTES
Spiritual Care Assessment/Progress Note  [facility]      NAME: Shelley Meza      MRN: [id number]  AGE: 80 y.o.  SEX: female  Protestant Affiliation: Other   Language: English     4/26/2023     Total Time (in minutes): 20     Spiritual Assessment begun in [address] through conversation with:         [x]Patient        [] Family    [] Friend(s)        Reason for Consult: Request by staff     Spiritual beliefs: (Please include comment if needed)     [] Identifies with a concha tradition:    \"other\" on file      [] Supported by a concha community:            [] Claims no spiritual orientation:           [] Seeking spiritual identity:                [] Adheres to an individual form of spirituality:           [x] Not able to assess:                           Identified resources for coping:      [] Prayer                               [] Music                  [] Guided Imagery     [x] Family/friends                 [] Pet visits     [] Devotional reading                         [] Unknown     [] Other:                                               Interventions offered during this visit: (See comments for more details)    Patient Interventions: Affirmation of emotions/emotional suffering, Initial/Spiritual assessment, patient floor, Normalization of emotional/spiritual concerns, Prayer (assurance of)           Plan of Care:     [] Support spiritual and/or cultural needs    [] Support AMD and/or advance care planning process      [] Support grieving process   [] Coordinate Rites and/or Rituals    [] Coordination with community clergy   [] No spiritual needs identified at this time   [] Detailed Plan of Care below (See Comments)  [] Make referral to Music Therapy  [] Make referral to Pet Therapy     [] Make referral to Addiction services  [] Make referral to [facility]  [] Make referral to Spiritual Care Partner  [] No future visits requested        [x] Contact Spiritual Care for further referrals Comments:   Spiritual Consult requesting encouragement for Ms. Tejinder Brizuela on [address] [facility] seemed like she was sleeping but responded right away when knocked on door calling her name.  introduced himself and started to share reason for visit when she tearfully said that she just wants to go home.  asked if she knew about how long she could be in the hospital for and she said angrily that she does not know because no body will tell her anything.  inquired about her family/support systems when she again interrupted angrily saying that writer should know that and she only wants to talk to someone who cares and no one cares around here.  responded softly that he does care and chaplains are available for her care and support everyday, for her and family. Ms. Nirav Saunders closed her eyes, turning away and would not speak again sadly.  expressed that we are here for her and would like to provide all the emotional and spiritual care and support for that we can, paused, no response, and dismissed himself. [facility]. Please contact Hospital  for [facility].    [phone]

## 2023-04-26 NOTE — PROGRESS NOTES
Problem: Falls - Risk of  Goal: *Absence of Falls  Description: Document Sundra Maben Fall Risk and appropriate interventions in the flowsheet.   Outcome: Progressing Towards Goal  Note: Fall Risk Interventions:                                Problem: Patient Education: Go to Patient Education Activity  Goal: Patient/Family Education  Outcome: Progressing Towards Goal

## 2023-04-26 NOTE — PROGRESS NOTES
1833  TRANSFER - IN REPORT:    Report sheet received on Hiwot Elaine  being received from ED(unit) for routine progression of care      Report consisted of patients Situation, Background, Assessment and   Recommendations(SBAR). Information from the following report(s) ED Summary was reviewed with the receiving nurse. Opportunity for questions and clarification was provided. Assessment completed upon patients arrival to unit and care assumed. [address] [facility] r/t patients HR estrella down to 30's-60's while sleeping. Patient is asymptomatic and very frustrated when being awoken. BP and Respiratory is stable.   Orders received

## 2023-04-26 NOTE — PROGRESS NOTES
Cm reviewed chart. GI to see patient. Current discharge plan when medically stable is to return back to [facility]/ [address].

## 2023-04-26 NOTE — PROGRESS NOTES
General Daily Progress Note          Patient Name:   Hiwot Elaine       YOB: 1932       Age:  80 y.o. Admit Date: 4/25/2023      Subjective:     CHIEF COMPLAINT:   Rectal bleed     HISTORY OF PRESENT ILLNESS:        Patient is a 80y.o. year old female history of advanced dementia anxiety and depression and emergency room because of rectal bleed  And living at the assisted living facility nurse went to change her diaper found a lot of bright red blood came to emergency room seen by the ER physician recommended patient to be admitted for GI bleed GI was consulted by the ER physician patient has no nausea vomiting diarrhea no constipation no fever no chills     No past medical history on file. No past surgical history on file. General Daily Progress Notes:    4/26  Pt is seen resting in bed, asking for  Joyce Daubs. She denies chest pain, SOB, abdominal pain, nausea, then became agitated, and again loudly asking for Joyce Daubs. Objective:     Visit Vitals  BP (!) 137/53   Pulse 65   Temp 97.4 °F (36.3 °C)   Resp 16   Ht 5' 6\" (1.676 m)   Wt 81.6 kg (180 lb)   SpO2 97%   BMI 29.05 kg/m²        Recent Results (from the past 24 hour(s))   CBC WITH AUTOMATED DIFF    Collection Time: 04/25/23  2:55 PM   Result Value Ref Range    WBC 11.2 (H) 3.6 - 11.0 K/uL    RBC 4.45 3.80 - 5.20 M/uL    HGB 13.2 11.5 - 16.0 g/dL    HCT 40.6 35.0 - 47.0 %    MCV 91.2 80.0 - 99.0 FL    MCH 29.7 26.0 - 34.0 PG    MCHC 32.5 30.0 - 36.5 g/dL    RDW 13.3 11.5 - 14.5 %    PLATELET 060 504 - 623 K/uL    MPV 9.8 8.9 - 12.9 FL    NRBC 0.0 0.0  WBC    ABSOLUTE NRBC 0.00 0.00 - 0.01 K/uL    NEUTROPHILS 70 32 - 75 %    LYMPHOCYTES 20 12 - 49 %    MONOCYTES 8 5 - 13 %    EOSINOPHILS 2 0 - 7 %    BASOPHILS 0 0 - 1 %    IMMATURE GRANULOCYTES 0 0 - 0.5 %    ABS. NEUTROPHILS 7.8 1.8 - 8.0 K/UL    ABS. LYMPHOCYTES 2.2 0.8 - 3.5 K/UL    ABS. MONOCYTES 0.9 0.0 - 1.0 K/UL    ABS. EOSINOPHILS 0.2 0.0 - 0.4 K/UL    ABS. BASOPHILS 0.1 0.0 - 0.1 K/UL    ABS. IMM. GRANS. 0.0 0.00 - 0.04 K/UL    DF AUTOMATED     METABOLIC PANEL, COMPREHENSIVE    Collection Time: 04/25/23  2:55 PM   Result Value Ref Range    Sodium 137 136 - 145 mmol/L    Potassium 3.8 3.5 - 5.1 mmol/L    Chloride 102 97 - 108 mmol/L    CO2 30 21 - 32 mmol/L    Anion gap 5 5 - 15 mmol/L    Glucose 135 (H) 65 - 100 mg/dL    BUN 18 6 - 20 mg/dL    Creatinine 0.83 0.55 - 1.02 mg/dL    BUN/Creatinine ratio 22 (H) 12 - 20      eGFR >60 >60 ml/min/1.73m2    Calcium 9.7 8.5 - 10.1 mg/dL    Bilirubin, total 0.4 0.2 - 1.0 mg/dL    AST (SGOT) 15 15 - 37 U/L    ALT (SGPT) 22 12 - 78 U/L    Alk.  phosphatase 93 45 - 117 U/L    Protein, total 7.8 6.4 - 8.2 g/dL    Albumin 3.3 (L) 3.5 - 5.0 g/dL    Globulin 4.5 (H) 2.0 - 4.0 g/dL    A-G Ratio 0.7 (L) 1.1 - 2.2     PROTHROMBIN TIME + INR    Collection Time: 04/25/23  2:55 PM   Result Value Ref Range    Prothrombin time 14.7 (H) 11.9 - 14.6 sec    INR 1.1 0.9 - 1.1     TYPE & SCREEN    Collection Time: 04/25/23  2:57 PM   Result Value Ref Range    Crossmatch Expiration 04/28/2023,2359     ABO/Rh(D) A Negative     Antibody screen Negative    CBC W/O DIFF    Collection Time: 04/25/23  3:52 PM   Result Value Ref Range    WBC 12.5 (H) 3.6 - 11.0 K/uL    RBC 4.21 3.80 - 5.20 M/uL    HGB 12.7 11.5 - 16.0 g/dL    HCT 38.7 35.0 - 47.0 %    MCV 91.9 80.0 - 99.0 FL    MCH 30.2 26.0 - 34.0 PG    MCHC 32.8 30.0 - 36.5 g/dL    RDW 13.3 11.5 - 14.5 %    PLATELET 324 492 - 649 K/uL    MPV 9.7 8.9 - 12.9 FL    NRBC 0.0 0.0  WBC    ABSOLUTE NRBC 0.00 0.00 - 2.23 K/uL   METABOLIC PANEL, COMPREHENSIVE    Collection Time: 04/26/23  6:05 AM   Result Value Ref Range    Sodium 140 136 - 145 mmol/L    Potassium 3.7 3.5 - 5.1 mmol/L    Chloride 109 (H) 97 - 108 mmol/L    CO2 28 21 - 32 mmol/L    Anion gap 3 (L) 5 - 15 mmol/L    Glucose 95 65 - 100 mg/dL    BUN 13 6 - 20 mg/dL    Creatinine 0.57 0.55 - 1.02 mg/dL    BUN/Creatinine ratio 23 (H) 12 - 20 eGFR >60 >60 ml/min/1.73m2    Calcium 8.6 8.5 - 10.1 mg/dL    Bilirubin, total 0.4 0.2 - 1.0 mg/dL    AST (SGOT) 11 (L) 15 - 37 U/L    ALT (SGPT) 17 12 - 78 U/L    Alk. phosphatase 69 45 - 117 U/L    Protein, total 6.0 (L) 6.4 - 8.2 g/dL    Albumin 2.7 (L) 3.5 - 5.0 g/dL    Globulin 3.3 2.0 - 4.0 g/dL    A-G Ratio 0.8 (L) 1.1 - 2.2     CBC WITH AUTOMATED DIFF    Collection Time: 04/26/23  6:05 AM   Result Value Ref Range    WBC 8.2 3.6 - 11.0 K/uL    RBC 3.60 (L) 3.80 - 5.20 M/uL    HGB 11.0 (L) 11.5 - 16.0 g/dL    HCT 33.1 (L) 35.0 - 47.0 %    MCV 91.9 80.0 - 99.0 FL    MCH 30.6 26.0 - 34.0 PG    MCHC 33.2 30.0 - 36.5 g/dL    RDW 13.3 11.5 - 14.5 %    PLATELET 862 979 - 646 K/uL    MPV 9.7 8.9 - 12.9 FL    NRBC 0.0 0.0  WBC    ABSOLUTE NRBC 0.00 0.00 - 0.01 K/uL    NEUTROPHILS 59 32 - 75 %    LYMPHOCYTES 25 12 - 49 %    MONOCYTES 11 5 - 13 %    EOSINOPHILS 4 0 - 7 %    BASOPHILS 1 0 - 1 %    IMMATURE GRANULOCYTES 0 0 - 0.5 %    ABS. NEUTROPHILS 4.9 1.8 - 8.0 K/UL    ABS. LYMPHOCYTES 2.0 0.8 - 3.5 K/UL    ABS. MONOCYTES 0.9 0.0 - 1.0 K/UL    ABS. EOSINOPHILS 0.3 0.0 - 0.4 K/UL    ABS. BASOPHILS 0.1 0.0 - 0.1 K/UL    ABS. IMM. GRANS. 0.0 0.00 - 0.04 K/UL    DF AUTOMATED       [unfilled]      Review of Systems    Constitutional: Negative for chills and fever. HENT: Negative. Eyes: Negative. Respiratory: Negative. Cardiovascular: Negative. Gastrointestinal: Negative for abdominal pain and nausea. Skin: Negative. Neurological: Negative. Physical Exam:      Constitutional: pt is altered at baseline with history of advanced dementia, agitated and upset today looking for   HENT:   Head: Normocephalic and atraumatic. Eyes: Pupils are equal, round, and reactive to light. EOM are normal.   Cardiovascular: Normal rate, regular rhythm and normal heart sounds. Pulmonary/Chest: Breath sounds normal. No wheezes. No rales. Exhibits no tenderness. Abdominal: Soft.  Bowel sounds are normal. There is no abdominal tenderness. There is no rebound and no guarding. Musculoskeletal: Normal range of motion. Neurological: pt is alert and oriented to person, place, and time. CT ABD PELV W WO CONT   Final Result   No acute intraperitoneal process is identified. Please see above for additional nonemergent incidental findings. Recent Results (from the past 24 hour(s))   CBC WITH AUTOMATED DIFF    Collection Time: 04/25/23  2:55 PM   Result Value Ref Range    WBC 11.2 (H) 3.6 - 11.0 K/uL    RBC 4.45 3.80 - 5.20 M/uL    HGB 13.2 11.5 - 16.0 g/dL    HCT 40.6 35.0 - 47.0 %    MCV 91.2 80.0 - 99.0 FL    MCH 29.7 26.0 - 34.0 PG    MCHC 32.5 30.0 - 36.5 g/dL    RDW 13.3 11.5 - 14.5 %    PLATELET 869 433 - 740 K/uL    MPV 9.8 8.9 - 12.9 FL    NRBC 0.0 0.0  WBC    ABSOLUTE NRBC 0.00 0.00 - 0.01 K/uL    NEUTROPHILS 70 32 - 75 %    LYMPHOCYTES 20 12 - 49 %    MONOCYTES 8 5 - 13 %    EOSINOPHILS 2 0 - 7 %    BASOPHILS 0 0 - 1 %    IMMATURE GRANULOCYTES 0 0 - 0.5 %    ABS. NEUTROPHILS 7.8 1.8 - 8.0 K/UL    ABS. LYMPHOCYTES 2.2 0.8 - 3.5 K/UL    ABS. MONOCYTES 0.9 0.0 - 1.0 K/UL    ABS. EOSINOPHILS 0.2 0.0 - 0.4 K/UL    ABS. BASOPHILS 0.1 0.0 - 0.1 K/UL    ABS. IMM. GRANS. 0.0 0.00 - 0.04 K/UL    DF AUTOMATED     METABOLIC PANEL, COMPREHENSIVE    Collection Time: 04/25/23  2:55 PM   Result Value Ref Range    Sodium 137 136 - 145 mmol/L    Potassium 3.8 3.5 - 5.1 mmol/L    Chloride 102 97 - 108 mmol/L    CO2 30 21 - 32 mmol/L    Anion gap 5 5 - 15 mmol/L    Glucose 135 (H) 65 - 100 mg/dL    BUN 18 6 - 20 mg/dL    Creatinine 0.83 0.55 - 1.02 mg/dL    BUN/Creatinine ratio 22 (H) 12 - 20      eGFR >60 >60 ml/min/1.73m2    Calcium 9.7 8.5 - 10.1 mg/dL    Bilirubin, total 0.4 0.2 - 1.0 mg/dL    AST (SGOT) 15 15 - 37 U/L    ALT (SGPT) 22 12 - 78 U/L    Alk.  phosphatase 93 45 - 117 U/L    Protein, total 7.8 6.4 - 8.2 g/dL    Albumin 3.3 (L) 3.5 - 5.0 g/dL    Globulin 4.5 (H) 2.0 - 4.0 g/dL A-G Ratio 0.7 (L) 1.1 - 2.2     PROTHROMBIN TIME + INR    Collection Time: 04/25/23  2:55 PM   Result Value Ref Range    Prothrombin time 14.7 (H) 11.9 - 14.6 sec    INR 1.1 0.9 - 1.1     TYPE & SCREEN    Collection Time: 04/25/23  2:57 PM   Result Value Ref Range    Crossmatch Expiration 04/28/2023,2359     ABO/Rh(D) A Negative     Antibody screen Negative    CBC W/O DIFF    Collection Time: 04/25/23  3:52 PM   Result Value Ref Range    WBC 12.5 (H) 3.6 - 11.0 K/uL    RBC 4.21 3.80 - 5.20 M/uL    HGB 12.7 11.5 - 16.0 g/dL    HCT 38.7 35.0 - 47.0 %    MCV 91.9 80.0 - 99.0 FL    MCH 30.2 26.0 - 34.0 PG    MCHC 32.8 30.0 - 36.5 g/dL    RDW 13.3 11.5 - 14.5 %    PLATELET 332 290 - 959 K/uL    MPV 9.7 8.9 - 12.9 FL    NRBC 0.0 0.0  WBC    ABSOLUTE NRBC 0.00 0.00 - 2.36 K/uL   METABOLIC PANEL, COMPREHENSIVE    Collection Time: 04/26/23  6:05 AM   Result Value Ref Range    Sodium 140 136 - 145 mmol/L    Potassium 3.7 3.5 - 5.1 mmol/L    Chloride 109 (H) 97 - 108 mmol/L    CO2 28 21 - 32 mmol/L    Anion gap 3 (L) 5 - 15 mmol/L    Glucose 95 65 - 100 mg/dL    BUN 13 6 - 20 mg/dL    Creatinine 0.57 0.55 - 1.02 mg/dL    BUN/Creatinine ratio 23 (H) 12 - 20      eGFR >60 >60 ml/min/1.73m2    Calcium 8.6 8.5 - 10.1 mg/dL    Bilirubin, total 0.4 0.2 - 1.0 mg/dL    AST (SGOT) 11 (L) 15 - 37 U/L    ALT (SGPT) 17 12 - 78 U/L    Alk.  phosphatase 69 45 - 117 U/L    Protein, total 6.0 (L) 6.4 - 8.2 g/dL    Albumin 2.7 (L) 3.5 - 5.0 g/dL    Globulin 3.3 2.0 - 4.0 g/dL    A-G Ratio 0.8 (L) 1.1 - 2.2     CBC WITH AUTOMATED DIFF    Collection Time: 04/26/23  6:05 AM   Result Value Ref Range    WBC 8.2 3.6 - 11.0 K/uL    RBC 3.60 (L) 3.80 - 5.20 M/uL    HGB 11.0 (L) 11.5 - 16.0 g/dL    HCT 33.1 (L) 35.0 - 47.0 %    MCV 91.9 80.0 - 99.0 FL    MCH 30.6 26.0 - 34.0 PG    MCHC 33.2 30.0 - 36.5 g/dL    RDW 13.3 11.5 - 14.5 %    PLATELET 255 365 - 593 K/uL    MPV 9.7 8.9 - 12.9 FL    NRBC 0.0 0.0  WBC    ABSOLUTE NRBC 0.00 0.00 - 0.01 K/uL    NEUTROPHILS 59 32 - 75 %    LYMPHOCYTES 25 12 - 49 %    MONOCYTES 11 5 - 13 %    EOSINOPHILS 4 0 - 7 %    BASOPHILS 1 0 - 1 %    IMMATURE GRANULOCYTES 0 0 - 0.5 %    ABS. NEUTROPHILS 4.9 1.8 - 8.0 K/UL    ABS. LYMPHOCYTES 2.0 0.8 - 3.5 K/UL    ABS. MONOCYTES 0.9 0.0 - 1.0 K/UL    ABS. EOSINOPHILS 0.3 0.0 - 0.4 K/UL    ABS. BASOPHILS 0.1 0.0 - 0.1 K/UL    ABS. IMM. GRANS. 0.0 0.00 - 0.04 K/UL    DF AUTOMATED         Results       ** No results found for the last 336 hours. **             Labs:     Recent Labs     04/26/23  0605 04/25/23  1552   WBC 8.2 12.5*   HGB 11.0* 12.7   HCT 33.1* 38.7    327     Recent Labs     04/26/23  0605 04/25/23  1455    137   K 3.7 3.8   * 102   CO2 28 30   BUN 13 18   CREA 0.57 0.83   GLU 95 135*   CA 8.6 9.7     Recent Labs     04/26/23  0605 04/25/23  1455   ALT 17 22   AP 69 93   TBILI 0.4 0.4   TP 6.0* 7.8   ALB 2.7* 3.3*   GLOB 3.3 4.5*     Recent Labs     04/25/23  1455   INR 1.1   PTP 14.7*      No results for input(s): FE, TIBC, PSAT, FERR in the last 72 hours. No results found for: FOL, RBCF   No results for input(s): PH, PCO2, PO2 in the last 72 hours. No results for input(s): CPK, CKNDX, TROIQ in the last 72 hours.     No lab exists for component: CPKMB  No results found for: CHOL, CHOLX, CHLST, CHOLV, HDL, HDLP, LDL, LDLC, DLDLP, TGLX, TRIGL, TRIGP, CHHD, CHHDX  No results found for: [facility]  Lab Results   Component Value Date/Time    Color Yellow/Straw 12/15/2022 02:49 PM    Appearance Hazy (A) 12/15/2022 02:49 PM    Specific gravity 1.025 12/15/2022 02:49 PM    pH (UA) 5.0 12/15/2022 02:49 PM    Protein Negative 12/15/2022 02:49 PM    Glucose Negative 12/15/2022 02:49 PM    Ketone Negative 12/15/2022 02:49 PM    Bilirubin Negative 12/15/2022 02:49 PM    Urobilinogen 1.0 12/15/2022 02:49 PM    Nitrites Positive (A) 12/15/2022 02:49 PM    Leukocyte Esterase Large (A) 12/15/2022 02:49 PM    Bacteria 1+ (A) 12/15/2022 02:49 PM    WBC >100 (H) 12/15/2022 02:49 PM    RBC 5-10 12/15/2022 02:49 PM         Assessment & Plan:   Acute GI Bleed  CT abdomen :   -  No acute intraperitoneal process is identified.   -  Colonic diverticulosis. -  There is no evidence of active extravasation.   -  There is no free intraperitoneal air.   -  There is no evidence of incarceration or obstruction.    IV fluids  GI consult pending  WBC elevated at 12.5  PT high at 14.7s, INR 1.1  Rectal Bleed  Advanced Dementia  Anxiety            Current Facility-Administered Medications:     0.9% sodium chloride infusion, 75 mL/hr, IntraVENous, CONTINUOUS, Alem Galan MD, Last Rate: 75 mL/hr at 04/26/23 0804, 75 mL/hr at 04/26/23 0804    acetaminophen (TYLENOL) tablet 650 mg, 650 mg, Oral, Q6H PRN, 650 mg at 04/25/23 1633 **OR** acetaminophen (TYLENOL) suppository 650 mg, 650 mg, Rectal, Q6H PRN, Alem Galan MD    polyethylene glycol (MIRALAX) packet 17 g, 17 g, Oral, DAILY PRN, Alem Galan MD    ondansetron (ZOFRAN ODT) tablet 4 mg, 4 mg, Oral, Q8H PRN **OR** ondansetron (ZOFRAN) injection 4 mg, 4 mg, IntraVENous, Q6H PRN, Alem Galan MD    pantoprazole (PROTONIX) 40 mg in 0.9% sodium chloride 10 mL injection, 40 mg, IntraVENous, DAILY, Alem Galan MD, 40 mg at 04/26/23 0800

## 2023-04-26 NOTE — PROGRESS NOTES
Received pt from [address] [place] [address]. Pt lying in bed on room asleep but arousable. Pt didn't want to be disturbed and was getting very agitated. IVF progressing and Purewick connected to suction. Bed alarm engaged.

## 2023-04-26 NOTE — PROGRESS NOTES
General Daily Progress Note          Patient Name:   Suzi Man       YOB: 1932       Age:  80 y.o. Admit Date: 4/25/2023      Subjective:     Patient is a 80y.o. year old female history of advanced dementia anxiety and depression and emergency room because of rectal bleed  And living at the assisted living facility nurse went to change her diaper found a lot of bright red blood came to emergency room seen by the ER physician recommended patient to be admitted for GI bleed GI was consulted by the ER physician patient has no nausea vomiting diarrhea no constipation no fever no chills        Patient today resting in the bed not in distress           Objective:     Visit Vitals  BP (!) 137/53   Pulse (!) 58   Temp 97.4 °F (36.3 °C)   Resp 16   Ht 5' 6\" (1.676 m)   Wt 81.6 kg (180 lb)   SpO2 97%   BMI 29.05 kg/m²        Recent Results (from the past 24 hour(s))   CBC WITH AUTOMATED DIFF    Collection Time: 04/25/23  2:55 PM   Result Value Ref Range    WBC 11.2 (H) 3.6 - 11.0 K/uL    RBC 4.45 3.80 - 5.20 M/uL    HGB 13.2 11.5 - 16.0 g/dL    HCT 40.6 35.0 - 47.0 %    MCV 91.2 80.0 - 99.0 FL    MCH 29.7 26.0 - 34.0 PG    MCHC 32.5 30.0 - 36.5 g/dL    RDW 13.3 11.5 - 14.5 %    PLATELET 557 541 - 361 K/uL    MPV 9.8 8.9 - 12.9 FL    NRBC 0.0 0.0  WBC    ABSOLUTE NRBC 0.00 0.00 - 0.01 K/uL    NEUTROPHILS 70 32 - 75 %    LYMPHOCYTES 20 12 - 49 %    MONOCYTES 8 5 - 13 %    EOSINOPHILS 2 0 - 7 %    BASOPHILS 0 0 - 1 %    IMMATURE GRANULOCYTES 0 0 - 0.5 %    ABS. NEUTROPHILS 7.8 1.8 - 8.0 K/UL    ABS. LYMPHOCYTES 2.2 0.8 - 3.5 K/UL    ABS. MONOCYTES 0.9 0.0 - 1.0 K/UL    ABS. EOSINOPHILS 0.2 0.0 - 0.4 K/UL    ABS. BASOPHILS 0.1 0.0 - 0.1 K/UL    ABS. IMM.  GRANS. 0.0 0.00 - 0.04 K/UL    DF AUTOMATED     METABOLIC PANEL, COMPREHENSIVE    Collection Time: 04/25/23  2:55 PM   Result Value Ref Range    Sodium 137 136 - 145 mmol/L    Potassium 3.8 3.5 - 5.1 mmol/L    Chloride 102 97 - 108 mmol/L    CO2 30 21 - 32 mmol/L    Anion gap 5 5 - 15 mmol/L    Glucose 135 (H) 65 - 100 mg/dL    BUN 18 6 - 20 mg/dL    Creatinine 0.83 0.55 - 1.02 mg/dL    BUN/Creatinine ratio 22 (H) 12 - 20      eGFR >60 >60 ml/min/1.73m2    Calcium 9.7 8.5 - 10.1 mg/dL    Bilirubin, total 0.4 0.2 - 1.0 mg/dL    AST (SGOT) 15 15 - 37 U/L    ALT (SGPT) 22 12 - 78 U/L    Alk. phosphatase 93 45 - 117 U/L    Protein, total 7.8 6.4 - 8.2 g/dL    Albumin 3.3 (L) 3.5 - 5.0 g/dL    Globulin 4.5 (H) 2.0 - 4.0 g/dL    A-G Ratio 0.7 (L) 1.1 - 2.2     PROTHROMBIN TIME + INR    Collection Time: 04/25/23  2:55 PM   Result Value Ref Range    Prothrombin time 14.7 (H) 11.9 - 14.6 sec    INR 1.1 0.9 - 1.1     TYPE & SCREEN    Collection Time: 04/25/23  2:57 PM   Result Value Ref Range    Crossmatch Expiration 04/28/2023,2359     ABO/Rh(D) A Negative     Antibody screen Negative    CBC W/O DIFF    Collection Time: 04/25/23  3:52 PM   Result Value Ref Range    WBC 12.5 (H) 3.6 - 11.0 K/uL    RBC 4.21 3.80 - 5.20 M/uL    HGB 12.7 11.5 - 16.0 g/dL    HCT 38.7 35.0 - 47.0 %    MCV 91.9 80.0 - 99.0 FL    MCH 30.2 26.0 - 34.0 PG    MCHC 32.8 30.0 - 36.5 g/dL    RDW 13.3 11.5 - 14.5 %    PLATELET 953 839 - 973 K/uL    MPV 9.7 8.9 - 12.9 FL    NRBC 0.0 0.0  WBC    ABSOLUTE NRBC 0.00 0.00 - 9.11 K/uL   METABOLIC PANEL, COMPREHENSIVE    Collection Time: 04/26/23  6:05 AM   Result Value Ref Range    Sodium 140 136 - 145 mmol/L    Potassium 3.7 3.5 - 5.1 mmol/L    Chloride 109 (H) 97 - 108 mmol/L    CO2 28 21 - 32 mmol/L    Anion gap 3 (L) 5 - 15 mmol/L    Glucose 95 65 - 100 mg/dL    BUN 13 6 - 20 mg/dL    Creatinine 0.57 0.55 - 1.02 mg/dL    BUN/Creatinine ratio 23 (H) 12 - 20      eGFR >60 >60 ml/min/1.73m2    Calcium 8.6 8.5 - 10.1 mg/dL    Bilirubin, total 0.4 0.2 - 1.0 mg/dL    AST (SGOT) 11 (L) 15 - 37 U/L    ALT (SGPT) 17 12 - 78 U/L    Alk.  phosphatase 69 45 - 117 U/L    Protein, total 6.0 (L) 6.4 - 8.2 g/dL    Albumin 2.7 (L) 3.5 - 5.0 g/dL    Globulin 3.3 2.0 - 4.0 g/dL    A-G Ratio 0.8 (L) 1.1 - 2.2     CBC WITH AUTOMATED DIFF    Collection Time: 04/26/23  6:05 AM   Result Value Ref Range    WBC 8.2 3.6 - 11.0 K/uL    RBC 3.60 (L) 3.80 - 5.20 M/uL    HGB 11.0 (L) 11.5 - 16.0 g/dL    HCT 33.1 (L) 35.0 - 47.0 %    MCV 91.9 80.0 - 99.0 FL    MCH 30.6 26.0 - 34.0 PG    MCHC 33.2 30.0 - 36.5 g/dL    RDW 13.3 11.5 - 14.5 %    PLATELET 398 787 - 940 K/uL    MPV 9.7 8.9 - 12.9 FL    NRBC 0.0 0.0  WBC    ABSOLUTE NRBC 0.00 0.00 - 0.01 K/uL    NEUTROPHILS 59 32 - 75 %    LYMPHOCYTES 25 12 - 49 %    MONOCYTES 11 5 - 13 %    EOSINOPHILS 4 0 - 7 %    BASOPHILS 1 0 - 1 %    IMMATURE GRANULOCYTES 0 0 - 0.5 %    ABS. NEUTROPHILS 4.9 1.8 - 8.0 K/UL    ABS. LYMPHOCYTES 2.0 0.8 - 3.5 K/UL    ABS. MONOCYTES 0.9 0.0 - 1.0 K/UL    ABS. EOSINOPHILS 0.3 0.0 - 0.4 K/UL    ABS. BASOPHILS 0.1 0.0 - 0.1 K/UL    ABS. IMM. GRANS. 0.0 0.00 - 0.04 K/UL    DF AUTOMATED       [unfilled]      Review of Systems    Constitutional: Negative for chills and fever. HENT: Negative. Eyes: Negative. Respiratory: Negative. Cardiovascular: Negative. Gastrointestinal: Negative for abdominal pain and nausea. Skin: Negative. Neurological: Negative. Physical Exam:      Constitutional: pt is oriented to person, place, and time. HENT:   Head: Normocephalic and atraumatic. Eyes: Pupils are equal, round, and reactive to light. EOM are normal.   Cardiovascular: Normal rate, regular rhythm and normal heart sounds. Pulmonary/Chest: Breath sounds normal. No wheezes. No rales. Exhibits no tenderness. Abdominal: Soft. Bowel sounds are normal. There is no abdominal tenderness. There is no rebound and no guarding. Musculoskeletal: Normal range of motion. Neurological: pt is alert and oriented to person, place, and time. CT ABD PELV W WO CONT   Final Result   No acute intraperitoneal process is identified. Please see above for additional nonemergent incidental findings. Recent Results (from the past 24 hour(s))   CBC WITH AUTOMATED DIFF    Collection Time: 04/25/23  2:55 PM   Result Value Ref Range    WBC 11.2 (H) 3.6 - 11.0 K/uL    RBC 4.45 3.80 - 5.20 M/uL    HGB 13.2 11.5 - 16.0 g/dL    HCT 40.6 35.0 - 47.0 %    MCV 91.2 80.0 - 99.0 FL    MCH 29.7 26.0 - 34.0 PG    MCHC 32.5 30.0 - 36.5 g/dL    RDW 13.3 11.5 - 14.5 %    PLATELET 515 564 - 710 K/uL    MPV 9.8 8.9 - 12.9 FL    NRBC 0.0 0.0  WBC    ABSOLUTE NRBC 0.00 0.00 - 0.01 K/uL    NEUTROPHILS 70 32 - 75 %    LYMPHOCYTES 20 12 - 49 %    MONOCYTES 8 5 - 13 %    EOSINOPHILS 2 0 - 7 %    BASOPHILS 0 0 - 1 %    IMMATURE GRANULOCYTES 0 0 - 0.5 %    ABS. NEUTROPHILS 7.8 1.8 - 8.0 K/UL    ABS. LYMPHOCYTES 2.2 0.8 - 3.5 K/UL    ABS. MONOCYTES 0.9 0.0 - 1.0 K/UL    ABS. EOSINOPHILS 0.2 0.0 - 0.4 K/UL    ABS. BASOPHILS 0.1 0.0 - 0.1 K/UL    ABS. IMM. GRANS. 0.0 0.00 - 0.04 K/UL    DF AUTOMATED     METABOLIC PANEL, COMPREHENSIVE    Collection Time: 04/25/23  2:55 PM   Result Value Ref Range    Sodium 137 136 - 145 mmol/L    Potassium 3.8 3.5 - 5.1 mmol/L    Chloride 102 97 - 108 mmol/L    CO2 30 21 - 32 mmol/L    Anion gap 5 5 - 15 mmol/L    Glucose 135 (H) 65 - 100 mg/dL    BUN 18 6 - 20 mg/dL    Creatinine 0.83 0.55 - 1.02 mg/dL    BUN/Creatinine ratio 22 (H) 12 - 20      eGFR >60 >60 ml/min/1.73m2    Calcium 9.7 8.5 - 10.1 mg/dL    Bilirubin, total 0.4 0.2 - 1.0 mg/dL    AST (SGOT) 15 15 - 37 U/L    ALT (SGPT) 22 12 - 78 U/L    Alk.  phosphatase 93 45 - 117 U/L    Protein, total 7.8 6.4 - 8.2 g/dL    Albumin 3.3 (L) 3.5 - 5.0 g/dL    Globulin 4.5 (H) 2.0 - 4.0 g/dL    A-G Ratio 0.7 (L) 1.1 - 2.2     PROTHROMBIN TIME + INR    Collection Time: 04/25/23  2:55 PM   Result Value Ref Range    Prothrombin time 14.7 (H) 11.9 - 14.6 sec    INR 1.1 0.9 - 1.1     TYPE & SCREEN    Collection Time: 04/25/23  2:57 PM   Result Value Ref Range    Crossmatch Expiration 04/28/2023,2359     ABO/Rh(D) A Negative     Antibody screen Negative    CBC W/O DIFF    Collection Time: 04/25/23  3:52 PM   Result Value Ref Range    WBC 12.5 (H) 3.6 - 11.0 K/uL    RBC 4.21 3.80 - 5.20 M/uL    HGB 12.7 11.5 - 16.0 g/dL    HCT 38.7 35.0 - 47.0 %    MCV 91.9 80.0 - 99.0 FL    MCH 30.2 26.0 - 34.0 PG    MCHC 32.8 30.0 - 36.5 g/dL    RDW 13.3 11.5 - 14.5 %    PLATELET 679 606 - 432 K/uL    MPV 9.7 8.9 - 12.9 FL    NRBC 0.0 0.0  WBC    ABSOLUTE NRBC 0.00 0.00 - 0.62 K/uL   METABOLIC PANEL, COMPREHENSIVE    Collection Time: 04/26/23  6:05 AM   Result Value Ref Range    Sodium 140 136 - 145 mmol/L    Potassium 3.7 3.5 - 5.1 mmol/L    Chloride 109 (H) 97 - 108 mmol/L    CO2 28 21 - 32 mmol/L    Anion gap 3 (L) 5 - 15 mmol/L    Glucose 95 65 - 100 mg/dL    BUN 13 6 - 20 mg/dL    Creatinine 0.57 0.55 - 1.02 mg/dL    BUN/Creatinine ratio 23 (H) 12 - 20      eGFR >60 >60 ml/min/1.73m2    Calcium 8.6 8.5 - 10.1 mg/dL    Bilirubin, total 0.4 0.2 - 1.0 mg/dL    AST (SGOT) 11 (L) 15 - 37 U/L    ALT (SGPT) 17 12 - 78 U/L    Alk. phosphatase 69 45 - 117 U/L    Protein, total 6.0 (L) 6.4 - 8.2 g/dL    Albumin 2.7 (L) 3.5 - 5.0 g/dL    Globulin 3.3 2.0 - 4.0 g/dL    A-G Ratio 0.8 (L) 1.1 - 2.2     CBC WITH AUTOMATED DIFF    Collection Time: 04/26/23  6:05 AM   Result Value Ref Range    WBC 8.2 3.6 - 11.0 K/uL    RBC 3.60 (L) 3.80 - 5.20 M/uL    HGB 11.0 (L) 11.5 - 16.0 g/dL    HCT 33.1 (L) 35.0 - 47.0 %    MCV 91.9 80.0 - 99.0 FL    MCH 30.6 26.0 - 34.0 PG    MCHC 33.2 30.0 - 36.5 g/dL    RDW 13.3 11.5 - 14.5 %    PLATELET 314 057 - 745 K/uL    MPV 9.7 8.9 - 12.9 FL    NRBC 0.0 0.0  WBC    ABSOLUTE NRBC 0.00 0.00 - 0.01 K/uL    NEUTROPHILS 59 32 - 75 %    LYMPHOCYTES 25 12 - 49 %    MONOCYTES 11 5 - 13 %    EOSINOPHILS 4 0 - 7 %    BASOPHILS 1 0 - 1 %    IMMATURE GRANULOCYTES 0 0 - 0.5 %    ABS. NEUTROPHILS 4.9 1.8 - 8.0 K/UL    ABS. LYMPHOCYTES 2.0 0.8 - 3.5 K/UL    ABS. MONOCYTES 0.9 0.0 - 1.0 K/UL    ABS. EOSINOPHILS 0.3 0.0 - 0.4 K/UL    ABS.  BASOPHILS 0.1 0.0 - 0.1 K/UL    ABS. IMM. GRANS. 0.0 0.00 - 0.04 K/UL    DF AUTOMATED         Results       ** No results found for the last 336 hours. **             Labs:     Recent Labs     04/26/23  0605 04/25/23  1552   WBC 8.2 12.5*   HGB 11.0* 12.7   HCT 33.1* 38.7    327     Recent Labs     04/26/23  0605 04/25/23  1455    137   K 3.7 3.8   * 102   CO2 28 30   BUN 13 18   CREA 0.57 0.83   GLU 95 135*   CA 8.6 9.7     Recent Labs     04/26/23  0605 04/25/23  1455   ALT 17 22   AP 69 93   TBILI 0.4 0.4   TP 6.0* 7.8   ALB 2.7* 3.3*   GLOB 3.3 4.5*     Recent Labs     04/25/23  1455   INR 1.1   PTP 14.7*      No results for input(s): FE, TIBC, PSAT, FERR in the last 72 hours. No results found for: FOL, RBCF   No results for input(s): PH, PCO2, PO2 in the last 72 hours. No results for input(s): CPK, CKNDX, TROIQ in the last 72 hours.     No lab exists for component: CPKMB  No results found for: CHOL, CHOLX, CHLST, CHOLV, HDL, HDLP, LDL, LDLC, DLDLP, TGLX, TRIGL, TRIGP, CHHD, CHHDX  No results found for: [facility]  Lab Results   Component Value Date/Time    Color Yellow/Straw 12/15/2022 02:49 PM    Appearance Hazy (A) 12/15/2022 02:49 PM    Specific gravity 1.025 12/15/2022 02:49 PM    pH (UA) 5.0 12/15/2022 02:49 PM    Protein Negative 12/15/2022 02:49 PM    Glucose Negative 12/15/2022 02:49 PM    Ketone Negative 12/15/2022 02:49 PM    Bilirubin Negative 12/15/2022 02:49 PM    Urobilinogen 1.0 12/15/2022 02:49 PM    Nitrites Positive (A) 12/15/2022 02:49 PM    Leukocyte Esterase Large (A) 12/15/2022 02:49 PM    Bacteria 1+ (A) 12/15/2022 02:49 PM    WBC >100 (H) 12/15/2022 02:49 PM    RBC 5-10 12/15/2022 02:49 PM         Assessment:     Acute GI bleed  Rectal bleed  Advanced dementia      Plan:       Continue current medications monitor H&H follow-up with GI for possible work-up      Current Facility-Administered Medications:     0.9% sodium chloride infusion, 75 mL/hr, IntraVENous, CONTINUOUS, Angelia Kash Hanley MD, Last Rate: 75 mL/hr at 04/26/23 0804, 75 mL/hr at 04/26/23 0804    acetaminophen (TYLENOL) tablet 650 mg, 650 mg, Oral, Q6H PRN, 650 mg at 04/25/23 1633 **OR** acetaminophen (TYLENOL) suppository 650 mg, 650 mg, Rectal, Q6H PRN, Alem Galan MD    polyethylene glycol (MIRALAX) packet 17 g, 17 g, Oral, DAILY PRN, Alem Galan MD    ondansetron (ZOFRAN ODT) tablet 4 mg, 4 mg, Oral, Q8H PRN **OR** ondansetron (ZOFRAN) injection 4 mg, 4 mg, IntraVENous, Q6H PRN, Alem Galan MD    pantoprazole (PROTONIX) 40 mg in 0.9% sodium chloride 10 mL injection, 40 mg, IntraVENous, DAILY, Alem Galan MD, 40 mg at 04/26/23 0800

## 2023-04-27 LAB
ALBUMIN SERPL-MCNC: 3 G/DL (ref 3.5–5)
ALBUMIN/GLOB SERPL: 0.9 (ref 1.1–2.2)
ALP SERPL-CCNC: 78 U/L (ref 45–117)
ALT SERPL-CCNC: 17 U/L (ref 12–78)
ANION GAP SERPL CALC-SCNC: 5 MMOL/L (ref 5–15)
AST SERPL W P-5'-P-CCNC: 14 U/L (ref 15–37)
BILIRUB SERPL-MCNC: 0.5 MG/DL (ref 0.2–1)
BUN SERPL-MCNC: 8 MG/DL (ref 6–20)
BUN/CREAT SERPL: 13 (ref 12–20)
CA-I BLD-MCNC: 8.9 MG/DL (ref 8.5–10.1)
CHLORIDE SERPL-SCNC: 109 MMOL/L (ref 97–108)
CO2 SERPL-SCNC: 23 MMOL/L (ref 21–32)
CREAT SERPL-MCNC: 0.62 MG/DL (ref 0.55–1.02)
ERYTHROCYTE [DISTWIDTH] IN BLOOD BY AUTOMATED COUNT: 13.1 % (ref 11.5–14.5)
GLOBULIN SER CALC-MCNC: 3.5 G/DL (ref 2–4)
GLUCOSE BLD STRIP.AUTO-MCNC: 99 MG/DL (ref 65–100)
GLUCOSE SERPL-MCNC: 106 MG/DL (ref 65–100)
HCT VFR BLD AUTO: 33.7 % (ref 35–47)
HGB BLD-MCNC: 11.1 G/DL (ref 11.5–16)
MCH RBC QN AUTO: 30.2 PG (ref 26–34)
MCHC RBC AUTO-ENTMCNC: 32.9 G/DL (ref 30–36.5)
MCV RBC AUTO: 91.6 FL (ref 80–99)
NRBC # BLD: 0 K/UL (ref 0–0.01)
NRBC BLD-RTO: 0 PER 100 WBC
PERFORMED BY, TECHID: NORMAL
PLATELET # BLD AUTO: 273 K/UL (ref 150–400)
PMV BLD AUTO: 9.5 FL (ref 8.9–12.9)
POTASSIUM SERPL-SCNC: 3.6 MMOL/L (ref 3.5–5.1)
PROT SERPL-MCNC: 6.5 G/DL (ref 6.4–8.2)
RBC # BLD AUTO: 3.68 M/UL (ref 3.8–5.2)
SODIUM SERPL-SCNC: 137 MMOL/L (ref 136–145)
WBC # BLD AUTO: 9.2 K/UL (ref 3.6–11)

## 2023-04-27 PROCEDURE — 36415 COLL VENOUS BLD VENIPUNCTURE: CPT

## 2023-04-27 PROCEDURE — 74011250637 HC RX REV CODE- 250/637: Performed by: FAMILY MEDICINE

## 2023-04-27 PROCEDURE — 65270000029 HC RM PRIVATE

## 2023-04-27 PROCEDURE — 80053 COMPREHEN METABOLIC PANEL: CPT

## 2023-04-27 PROCEDURE — 74011000250 HC RX REV CODE- 250: Performed by: FAMILY MEDICINE

## 2023-04-27 PROCEDURE — 85027 COMPLETE CBC AUTOMATED: CPT

## 2023-04-27 PROCEDURE — C9113 INJ PANTOPRAZOLE SODIUM, VIA: HCPCS | Performed by: FAMILY MEDICINE

## 2023-04-27 PROCEDURE — 82962 GLUCOSE BLOOD TEST: CPT

## 2023-04-27 PROCEDURE — 74011250636 HC RX REV CODE- 250/636: Performed by: FAMILY MEDICINE

## 2023-04-27 RX ADMIN — ACETAMINOPHEN 650 MG: 325 TABLET ORAL at 07:51

## 2023-04-27 RX ADMIN — SODIUM CHLORIDE 75 ML/HR: 9 INJECTION, SOLUTION INTRAVENOUS at 07:21

## 2023-04-27 RX ADMIN — ACETAMINOPHEN 650 MG: 325 TABLET ORAL at 17:17

## 2023-04-27 RX ADMIN — SODIUM CHLORIDE, PRESERVATIVE FREE 40 MG: 5 INJECTION INTRAVENOUS at 08:00

## 2023-04-27 RX ADMIN — SODIUM CHLORIDE 75 ML/HR: 9 INJECTION, SOLUTION INTRAVENOUS at 15:58

## 2023-04-27 NOTE — PROGRESS NOTES
General Daily Progress Note          Patient Name:   Alvaro Zuleta       YOB: 1932       Age:  80 y.o. Admit Date: 4/25/2023      Subjective:     Patient is a 80y.o. year old female history of advanced dementia anxiety and depression and emergency room because of rectal bleed  And living at the assisted living facility nurse went to change her diaper found a lot of bright red blood came to emergency room seen by the ER physician recommended patient to be admitted for GI bleed GI was consulted by the ER physician patient has no nausea vomiting diarrhea no constipation no fever no chills        Patient today resting in the bed not in distress     4/27  Pt is seen in bed, more calm today. ROS is difficult to obtain as patient remains confused at baseline. Had episode of atrial fibrillation last night with slow ventricular rate.    Telemetry box reads atrial flutter 41-78      Objective:     Visit Vitals  BP (!) 141/62 (BP 1 Location: Left upper arm, BP Patient Position: At rest)   Pulse 66   Temp 97.5 °F (36.4 °C)   Resp 18   Ht 5' 6\" (1.676 m)   Wt 81.6 kg (180 lb)   SpO2 97%   BMI 29.05 kg/m²        Recent Results (from the past 24 hour(s))   CBC W/O DIFF    Collection Time: 04/27/23  8:23 AM   Result Value Ref Range    WBC 9.2 3.6 - 11.0 K/uL    RBC 3.68 (L) 3.80 - 5.20 M/uL    HGB 11.1 (L) 11.5 - 16.0 g/dL    HCT 33.7 (L) 35.0 - 47.0 %    MCV 91.6 80.0 - 99.0 FL    MCH 30.2 26.0 - 34.0 PG    MCHC 32.9 30.0 - 36.5 g/dL    RDW 13.1 11.5 - 14.5 %    PLATELET 391 518 - 644 K/uL    MPV 9.5 8.9 - 12.9 FL    NRBC 0.0 0.0  WBC    ABSOLUTE NRBC 0.00 0.00 - 8.84 K/uL   METABOLIC PANEL, COMPREHENSIVE    Collection Time: 04/27/23  8:23 AM   Result Value Ref Range    Sodium 137 136 - 145 mmol/L    Potassium 3.6 3.5 - 5.1 mmol/L    Chloride 109 (H) 97 - 108 mmol/L    CO2 23 21 - 32 mmol/L    Anion gap 5 5 - 15 mmol/L    Glucose 106 (H) 65 - 100 mg/dL    BUN 8 6 - 20 mg/dL    Creatinine 0.62 0.55 - 1.02 mg/dL    BUN/Creatinine ratio 13 12 - 20      eGFR >60 >60 ml/min/1.73m2    Calcium 8.9 8.5 - 10.1 mg/dL    Bilirubin, total 0.5 0.2 - 1.0 mg/dL    AST (SGOT) 14 (L) 15 - 37 U/L    ALT (SGPT) 17 12 - 78 U/L    Alk. phosphatase 78 45 - 117 U/L    Protein, total 6.5 6.4 - 8.2 g/dL    Albumin 3.0 (L) 3.5 - 5.0 g/dL    Globulin 3.5 2.0 - 4.0 g/dL    A-G Ratio 0.9 (L) 1.1 - 2.2       [unfilled]      Review of Systems    Constitutional: Negative for chills and fever. HENT: Negative. Eyes: Negative. Respiratory: Negative. Cardiovascular: Negative. Gastrointestinal: Negative for abdominal pain and nausea. Skin: Negative. Neurological: Negative. Physical Exam:      Constitutional: pt is oriented to person, place, and time. HENT:   Head: Normocephalic and atraumatic. Eyes: Pupils are equal, round, and reactive to light. EOM are normal. left lower lid erythematous + mild ectropion, left eye with significant crusting/drainage. Cardiovascular: Normal rate, regular rhythm and normal heart sounds. Pulmonary/Chest: Breath sounds normal. No wheezes. No rales. Exhibits no tenderness. Abdominal: Soft. Bowel sounds are normal. There is no abdominal tenderness. There is no rebound and no guarding. Musculoskeletal: Normal range of motion. Neurological: pt is alert and oriented to person, place, and time. CT ABD PELV W WO CONT   Final Result   No acute intraperitoneal process is identified. Please see above for additional nonemergent incidental findings.                   Recent Results (from the past 24 hour(s))   CBC W/O DIFF    Collection Time: 04/27/23  8:23 AM   Result Value Ref Range    WBC 9.2 3.6 - 11.0 K/uL    RBC 3.68 (L) 3.80 - 5.20 M/uL    HGB 11.1 (L) 11.5 - 16.0 g/dL    HCT 33.7 (L) 35.0 - 47.0 %    MCV 91.6 80.0 - 99.0 FL    MCH 30.2 26.0 - 34.0 PG    MCHC 32.9 30.0 - 36.5 g/dL    RDW 13.1 11.5 - 14.5 %    PLATELET 086 091 - 281 K/uL    MPV 9.5 8.9 - 12.9 FL    NRBC 0.0 0. 0  WBC    ABSOLUTE NRBC 0.00 0.00 - 1.79 K/uL   METABOLIC PANEL, COMPREHENSIVE    Collection Time: 04/27/23  8:23 AM   Result Value Ref Range    Sodium 137 136 - 145 mmol/L    Potassium 3.6 3.5 - 5.1 mmol/L    Chloride 109 (H) 97 - 108 mmol/L    CO2 23 21 - 32 mmol/L    Anion gap 5 5 - 15 mmol/L    Glucose 106 (H) 65 - 100 mg/dL    BUN 8 6 - 20 mg/dL    Creatinine 0.62 0.55 - 1.02 mg/dL    BUN/Creatinine ratio 13 12 - 20      eGFR >60 >60 ml/min/1.73m2    Calcium 8.9 8.5 - 10.1 mg/dL    Bilirubin, total 0.5 0.2 - 1.0 mg/dL    AST (SGOT) 14 (L) 15 - 37 U/L    ALT (SGPT) 17 12 - 78 U/L    Alk. phosphatase 78 45 - 117 U/L    Protein, total 6.5 6.4 - 8.2 g/dL    Albumin 3.0 (L) 3.5 - 5.0 g/dL    Globulin 3.5 2.0 - 4.0 g/dL    A-G Ratio 0.9 (L) 1.1 - 2.2         Results       ** No results found for the last 336 hours. **             Labs:     Recent Labs     04/27/23 0823 04/26/23  0605   WBC 9.2 8.2   HGB 11.1* 11.0*   HCT 33.7* 33.1*    251       Recent Labs     04/27/23 0823 04/26/23  0605 04/25/23  1455    140 137   K 3.6 3.7 3.8   * 109* 102   CO2 23 28 30   BUN 8 13 18   CREA 0.62 0.57 0.83   * 95 135*   CA 8.9 8.6 9.7       Recent Labs     04/27/23 0823 04/26/23  0605 04/25/23  1455   ALT 17 17 22   AP 78 69 93   TBILI 0.5 0.4 0.4   TP 6.5 6.0* 7.8   ALB 3.0* 2.7* 3.3*   GLOB 3.5 3.3 4.5*       Recent Labs     04/25/23  1455   INR 1.1   PTP 14.7*        No results for input(s): FE, TIBC, PSAT, FERR in the last 72 hours. No results found for: FOL, RBCF   No results for input(s): PH, PCO2, PO2 in the last 72 hours. No results for input(s): CPK, CKNDX, TROIQ in the last 72 hours.     No lab exists for component: CPKMB  No results found for: CHOL, CHOLX, CHLST, CHOLV, HDL, HDLP, LDL, LDLC, DLDLP, TGLX, TRIGL, TRIGP, CHHD, CHHDX  No results found for: [facility]  Lab Results   Component Value Date/Time    Color Yellow/Straw 12/15/2022 02:49 PM    Appearance Hazy (A) 12/15/2022 02:49 PM    Specific gravity 1.025 12/15/2022 02:49 PM    pH (UA) 5.0 12/15/2022 02:49 PM    Protein Negative 12/15/2022 02:49 PM    Glucose Negative 12/15/2022 02:49 PM    Ketone Negative 12/15/2022 02:49 PM    Bilirubin Negative 12/15/2022 02:49 PM    Urobilinogen 1.0 12/15/2022 02:49 PM    Nitrites Positive (A) 12/15/2022 02:49 PM    Leukocyte Esterase Large (A) 12/15/2022 02:49 PM    Bacteria 1+ (A) 12/15/2022 02:49 PM    WBC >100 (H) 12/15/2022 02:49 PM    RBC 5-10 12/15/2022 02:49 PM         Assessment:     Acute GI bleed  Rectal bleed  Advanced dementia  Atrial flutter  Possible Conjunctivitis (L)      Plan:     Consult cardiology due to abnormal EKGs with atrial flutter/fibrillation    Continue current medications monitor H&H follow-up with GI for possible work-up    Pt is not on any thinners  GI consulted and suggests BRBPR as transient and self limiting 2/2 hemorrhoids vs perianal disease  - If bleeding per rectum persists GI considering flexible sigmoidoscopy on 4/28 or 4/29 depending on condition of patient    Ophthalmic ointment      Current Facility-Administered Medications:     0.9% sodium chloride infusion, 75 mL/hr, IntraVENous, CONTINUOUS, Alem Galan MD, Last Rate: 75 mL/hr at 04/27/23 0721, 75 mL/hr at 04/27/23 0721    acetaminophen (TYLENOL) tablet 650 mg, 650 mg, Oral, Q6H PRN, 650 mg at 04/27/23 0751 **OR** acetaminophen (TYLENOL) suppository 650 mg, 650 mg, Rectal, Q6H PRN, Alem Galan MD    polyethylene glycol (MIRALAX) packet 17 g, 17 g, Oral, DAILY PRN, Alem Galan MD    ondansetron (ZOFRAN ODT) tablet 4 mg, 4 mg, Oral, Q8H PRN **OR** ondansetron (ZOFRAN) injection 4 mg, 4 mg, IntraVENous, Q6H PRN, Alem Galan MD    pantoprazole (PROTONIX) 40 mg in 0.9% sodium chloride 10 mL injection, 40 mg, IntraVENous, DAILY, Alem Galan MD, 40 mg at 04/27/23 0800

## 2023-04-27 NOTE — PROGRESS NOTES
Bedside, Verbal, and Written shift change report given to Mendez Johnson   (oncoming nurse) by Álvaro Miller   (offgoing nurse). Report included the following information SBAR.

## 2023-04-27 NOTE — CONSULTS
Gastroenterology Consult     Referring Physician: None     Consult Date: 4/26/2023     Subjective:     Patient is a resident of nursing home she does not give any meaningful history most of the historical data was gathered from patient's chart and interviewing the nursing staff patient was brought in because blood was found on her diapers I cannot see any blood thinners on the chart and her hemoglobin is stable. History reviewed. No pertinent past medical history. History reviewed. No pertinent surgical history. No family history on file. Social History     Tobacco Use    Smoking status: Not on file    Smokeless tobacco: Not on file   Substance Use Topics    Alcohol use: Not on file      No Known Allergies  Current Facility-Administered Medications   Medication Dose Route Frequency    0.9% sodium chloride infusion  75 mL/hr IntraVENous CONTINUOUS    acetaminophen (TYLENOL) tablet 650 mg  650 mg Oral Q6H PRN    Or    acetaminophen (TYLENOL) suppository 650 mg  650 mg Rectal Q6H PRN    polyethylene glycol (MIRALAX) packet 17 g  17 g Oral DAILY PRN    ondansetron (ZOFRAN ODT) tablet 4 mg  4 mg Oral Q8H PRN    Or    ondansetron (ZOFRAN) injection 4 mg  4 mg IntraVENous Q6H PRN    pantoprazole (PROTONIX) 40 mg in 0.9% sodium chloride 10 mL injection  40 mg IntraVENous DAILY        Review of Systems:  A detailed 10 organ review of systems is obtained with pertinent positives as listed in the History of Present Illness and Past Medical History. All others are negative. Objective:     Physical Exam:  Visit Vitals  /63 (BP 1 Location: Left upper arm, BP Patient Position: Lying)   Pulse 66   Temp 98.1 °F (36.7 °C)   Resp 18   Ht 5' 6\" (1.676 m)   Wt 81.6 kg (180 lb)   SpO2 (!) 73%   BMI 29.05 kg/m²        Skin:  Extremities and face reveal no rashes. No ayala erythema. No telangiectasias on the chest wall. HEENT: Sclerae anicteric. Extra-occular muscles are intact. No oral ulcers.   No abnormal pigmentation of the lips. The neck is supple. Cardiovascular: Regular rate and rhythm. No murmurs, gallops, or rubs. PMI nondisplaced. Carotids without bruits. Respiratory:  Comfortable breathing with no accessory muscle use. Clear breath sounds with no wheezes, rales, or rhonchi. GI:  Abdomen nondistended, soft, and nontender. Normal active bowel sounds. No enlargement of the liver or spleen. No masses palpable. Rectal:  Deferred  Musculoskeletal:  No pitting edema of the lower legs. Extremities have good range of motion. No costovertebral tenderness. Neurological: Patient does not communicate  Psychiatric: Patient does not communicate  Lymphatic:  No cervical or supraclavicular adenopathy. Lab/Data Review:  Recent Results (from the past 24 hour(s))   EKG, 12 LEAD, INITIAL    Collection Time: 04/25/23 11:07 PM   Result Value Ref Range    Ventricular Rate 53 BPM    Atrial Rate 46 BPM    QRS Duration 86 ms    Q-T Interval 472 ms    QTC Calculation (Bezet) 442 ms    Calculated R Axis 4 degrees    Calculated T Axis -165 degrees    Diagnosis       Atrial fibrillation with slow ventricular response  Anteroseptal infarct , age undetermined  ST & T wave abnormality, consider lateral ischemia  Abnormal ECG  No previous ECGs available  Confirmed by Lorelei Grajeda ([id number]) on 4/26/2023 1:40:88 PM     METABOLIC PANEL, COMPREHENSIVE    Collection Time: 04/26/23  6:05 AM   Result Value Ref Range    Sodium 140 136 - 145 mmol/L    Potassium 3.7 3.5 - 5.1 mmol/L    Chloride 109 (H) 97 - 108 mmol/L    CO2 28 21 - 32 mmol/L    Anion gap 3 (L) 5 - 15 mmol/L    Glucose 95 65 - 100 mg/dL    BUN 13 6 - 20 mg/dL    Creatinine 0.57 0.55 - 1.02 mg/dL    BUN/Creatinine ratio 23 (H) 12 - 20      eGFR >60 >60 ml/min/1.73m2    Calcium 8.6 8.5 - 10.1 mg/dL    Bilirubin, total 0.4 0.2 - 1.0 mg/dL    AST (SGOT) 11 (L) 15 - 37 U/L    ALT (SGPT) 17 12 - 78 U/L    Alk.  phosphatase 69 45 - 117 U/L    Protein, total 6.0 (L) 6.4 - 8.2 g/dL    Albumin 2.7 (L) 3.5 - 5.0 g/dL    Globulin 3.3 2.0 - 4.0 g/dL    A-G Ratio 0.8 (L) 1.1 - 2.2     CBC WITH AUTOMATED DIFF    Collection Time: 04/26/23  6:05 AM   Result Value Ref Range    WBC 8.2 3.6 - 11.0 K/uL    RBC 3.60 (L) 3.80 - 5.20 M/uL    HGB 11.0 (L) 11.5 - 16.0 g/dL    HCT 33.1 (L) 35.0 - 47.0 %    MCV 91.9 80.0 - 99.0 FL    MCH 30.6 26.0 - 34.0 PG    MCHC 33.2 30.0 - 36.5 g/dL    RDW 13.3 11.5 - 14.5 %    PLATELET 271 514 - 038 K/uL    MPV 9.7 8.9 - 12.9 FL    NRBC 0.0 0.0  WBC    ABSOLUTE NRBC 0.00 0.00 - 0.01 K/uL    NEUTROPHILS 59 32 - 75 %    LYMPHOCYTES 25 12 - 49 %    MONOCYTES 11 5 - 13 %    EOSINOPHILS 4 0 - 7 %    BASOPHILS 1 0 - 1 %    IMMATURE GRANULOCYTES 0 0 - 0.5 %    ABS. NEUTROPHILS 4.9 1.8 - 8.0 K/UL    ABS. LYMPHOCYTES 2.0 0.8 - 3.5 K/UL    ABS. MONOCYTES 0.9 0.0 - 1.0 K/UL    ABS. EOSINOPHILS 0.3 0.0 - 0.4 K/UL    ABS. BASOPHILS 0.1 0.0 - 0.1 K/UL    ABS. IMM. GRANS. 0.0 0.00 - 0.04 K/UL    DF AUTOMATED          CT ABD PELV W WO CONT   Final Result   No acute intraperitoneal process is identified. Please see above for additional nonemergent incidental findings. Assessment/Plan:     Active Problems:    GI bleed (4/25/2023)      Acute GI bleeding (4/25/2023)    Patient has a transient self-limiting rectal bleeding which could be due to hemorrhoids or perianal disease if patient's bleeding persists we can consider a flexible sigmoidoscopy either tomorrow or Friday depending upon patient's condition.       IP CONSULT TO GASTROENTEROLOGY  IP CONSULT TO CARDIOLOGY    Thank you for allowing me to participate in this patients care  Cc Referring Physician   None

## 2023-04-27 NOTE — PROGRESS NOTES
General Daily Progress Note          Patient Name:   Marlena Guy       YOB: 1932       Age:  80 y.o. Admit Date: 4/25/2023      Subjective:     Patient is a 80y.o. year old female history of advanced dementia anxiety and depression and emergency room because of rectal bleed  And living at the assisted living facility nurse went to change her diaper found a lot of bright red blood came to emergency room seen by the ER physician recommended patient to be admitted for GI bleed GI was consulted by the ER physician patient has no nausea vomiting diarrhea no constipation no fever no chills        Patient today resting in the bed not in distress     4/27  Pt is seen in bed, more calm today. ROS is difficult to obtain as patient remains confused at baseline. Had episode of atrial fibrillation last night with slow ventricular rate.    Telemetry box reads atrial flutter 41-78      Objective:     Visit Vitals  BP (!) 141/62 (BP 1 Location: Left upper arm, BP Patient Position: At rest)   Pulse 66   Temp 97.5 °F (36.4 °C)   Resp 18   Ht 5' 6\" (1.676 m)   Wt 81.6 kg (180 lb)   SpO2 97%   BMI 29.05 kg/m²        Recent Results (from the past 24 hour(s))   CBC W/O DIFF    Collection Time: 04/27/23  8:23 AM   Result Value Ref Range    WBC 9.2 3.6 - 11.0 K/uL    RBC 3.68 (L) 3.80 - 5.20 M/uL    HGB 11.1 (L) 11.5 - 16.0 g/dL    HCT 33.7 (L) 35.0 - 47.0 %    MCV 91.6 80.0 - 99.0 FL    MCH 30.2 26.0 - 34.0 PG    MCHC 32.9 30.0 - 36.5 g/dL    RDW 13.1 11.5 - 14.5 %    PLATELET 914 305 - 602 K/uL    MPV 9.5 8.9 - 12.9 FL    NRBC 0.0 0.0  WBC    ABSOLUTE NRBC 0.00 0.00 - 4.76 K/uL   METABOLIC PANEL, COMPREHENSIVE    Collection Time: 04/27/23  8:23 AM   Result Value Ref Range    Sodium 137 136 - 145 mmol/L    Potassium 3.6 3.5 - 5.1 mmol/L    Chloride 109 (H) 97 - 108 mmol/L    CO2 23 21 - 32 mmol/L    Anion gap 5 5 - 15 mmol/L    Glucose 106 (H) 65 - 100 mg/dL    BUN 8 6 - 20 mg/dL    Creatinine 0.62 0.55 - 1.02 mg/dL    BUN/Creatinine ratio 13 12 - 20      eGFR >60 >60 ml/min/1.73m2    Calcium 8.9 8.5 - 10.1 mg/dL    Bilirubin, total 0.5 0.2 - 1.0 mg/dL    AST (SGOT) 14 (L) 15 - 37 U/L    ALT (SGPT) 17 12 - 78 U/L    Alk. phosphatase 78 45 - 117 U/L    Protein, total 6.5 6.4 - 8.2 g/dL    Albumin 3.0 (L) 3.5 - 5.0 g/dL    Globulin 3.5 2.0 - 4.0 g/dL    A-G Ratio 0.9 (L) 1.1 - 2.2       [unfilled]      Review of Systems    Constitutional: Negative for chills and fever. HENT: Negative. Eyes: Negative. Respiratory: Negative. Cardiovascular: Negative. Gastrointestinal: Negative for abdominal pain and nausea. Skin: Negative. Neurological: Negative. Physical Exam:      Constitutional: pt is oriented to person, place, and time. HENT:   Head: Normocephalic and atraumatic. Eyes: Pupils are equal, round, and reactive to light. EOM are normal.   Cardiovascular: Normal rate, regular rhythm and normal heart sounds. Pulmonary/Chest: Breath sounds normal. No wheezes. No rales. Exhibits no tenderness. Abdominal: Soft. Bowel sounds are normal. There is no abdominal tenderness. There is no rebound and no guarding. Musculoskeletal: Normal range of motion. Neurological: pt is alert and oriented to person, place, and time. CT ABD PELV W WO CONT   Final Result   No acute intraperitoneal process is identified. Please see above for additional nonemergent incidental findings.                   Recent Results (from the past 24 hour(s))   CBC W/O DIFF    Collection Time: 04/27/23  8:23 AM   Result Value Ref Range    WBC 9.2 3.6 - 11.0 K/uL    RBC 3.68 (L) 3.80 - 5.20 M/uL    HGB 11.1 (L) 11.5 - 16.0 g/dL    HCT 33.7 (L) 35.0 - 47.0 %    MCV 91.6 80.0 - 99.0 FL    MCH 30.2 26.0 - 34.0 PG    MCHC 32.9 30.0 - 36.5 g/dL    RDW 13.1 11.5 - 14.5 %    PLATELET 319 962 - 467 K/uL    MPV 9.5 8.9 - 12.9 FL    NRBC 0.0 0.0  WBC    ABSOLUTE NRBC 0.00 0.00 - 7.97 K/uL   METABOLIC PANEL, COMPREHENSIVE Collection Time: 04/27/23  8:23 AM   Result Value Ref Range    Sodium 137 136 - 145 mmol/L    Potassium 3.6 3.5 - 5.1 mmol/L    Chloride 109 (H) 97 - 108 mmol/L    CO2 23 21 - 32 mmol/L    Anion gap 5 5 - 15 mmol/L    Glucose 106 (H) 65 - 100 mg/dL    BUN 8 6 - 20 mg/dL    Creatinine 0.62 0.55 - 1.02 mg/dL    BUN/Creatinine ratio 13 12 - 20      eGFR >60 >60 ml/min/1.73m2    Calcium 8.9 8.5 - 10.1 mg/dL    Bilirubin, total 0.5 0.2 - 1.0 mg/dL    AST (SGOT) 14 (L) 15 - 37 U/L    ALT (SGPT) 17 12 - 78 U/L    Alk. phosphatase 78 45 - 117 U/L    Protein, total 6.5 6.4 - 8.2 g/dL    Albumin 3.0 (L) 3.5 - 5.0 g/dL    Globulin 3.5 2.0 - 4.0 g/dL    A-G Ratio 0.9 (L) 1.1 - 2.2         Results       ** No results found for the last 336 hours. **             Labs:     Recent Labs     04/27/23  0823 04/26/23  0605   WBC 9.2 8.2   HGB 11.1* 11.0*   HCT 33.7* 33.1*    251       Recent Labs     04/27/23  0823 04/26/23  0605 04/25/23  1455    140 137   K 3.6 3.7 3.8   * 109* 102   CO2 23 28 30   BUN 8 13 18   CREA 0.62 0.57 0.83   * 95 135*   CA 8.9 8.6 9.7       Recent Labs     04/27/23  0823 04/26/23  0605 04/25/23  1455   ALT 17 17 22   AP 78 69 93   TBILI 0.5 0.4 0.4   TP 6.5 6.0* 7.8   ALB 3.0* 2.7* 3.3*   GLOB 3.5 3.3 4.5*       Recent Labs     04/25/23  1455   INR 1.1   PTP 14.7*        No results for input(s): FE, TIBC, PSAT, FERR in the last 72 hours. No results found for: FOL, RBCF   No results for input(s): PH, PCO2, PO2 in the last 72 hours. No results for input(s): CPK, CKNDX, TROIQ in the last 72 hours.     No lab exists for component: CPKMB  No results found for: CHOL, CHOLX, CHLST, CHOLV, HDL, HDLP, LDL, LDLC, DLDLP, TGLX, TRIGL, TRIGP, CHHD, CHHDX  No results found for: GLUCPOC  Lab Results   Component Value Date/Time    Color Yellow/Straw 12/15/2022 02:49 PM    Appearance Hazy (A) 12/15/2022 02:49 PM    Specific gravity 1.025 12/15/2022 02:49 PM    pH (UA) 5.0 12/15/2022 02:49 PM Protein Negative 12/15/2022 02:49 PM    Glucose Negative 12/15/2022 02:49 PM    Ketone Negative 12/15/2022 02:49 PM    Bilirubin Negative 12/15/2022 02:49 PM    Urobilinogen 1.0 12/15/2022 02:49 PM    Nitrites Positive (A) 12/15/2022 02:49 PM    Leukocyte Esterase Large (A) 12/15/2022 02:49 PM    Bacteria 1+ (A) 12/15/2022 02:49 PM    WBC >100 (H) 12/15/2022 02:49 PM    RBC 5-10 12/15/2022 02:49 PM         Assessment:     Acute GI bleed  Rectal bleed  Advanced dementia  Atrial flutter/resolve  Chronic A-fib      Plan:     Monitor H&H and follow-up with the GI      Current Facility-Administered Medications:     0.9% sodium chloride infusion, 75 mL/hr, IntraVENous, CONTINUOUS, Alem Galan MD, Last Rate: 75 mL/hr at 04/27/23 0721, 75 mL/hr at 04/27/23 0721    acetaminophen (TYLENOL) tablet 650 mg, 650 mg, Oral, Q6H PRN, 650 mg at 04/27/23 0751 **OR** acetaminophen (TYLENOL) suppository 650 mg, 650 mg, Rectal, Q6H PRN, Alem Galan MD    polyethylene glycol (MIRALAX) packet 17 g, 17 g, Oral, DAILY PRN, Alem Galan MD    ondansetron (ZOFRAN ODT) tablet 4 mg, 4 mg, Oral, Q8H PRN **OR** ondansetron (ZOFRAN) injection 4 mg, 4 mg, IntraVENous, Q6H PRN, Alem Galan MD    pantoprazole (PROTONIX) 40 mg in 0.9% sodium chloride 10 mL injection, 40 mg, IntraVENous, DAILY, Alem Galan MD, 40 mg at 04/27/23 0800

## 2023-04-27 NOTE — PROGRESS NOTES
Problem: Falls - Risk of  Goal: *Absence of Falls  Description: Document Luh Pretty Fall Risk and appropriate interventions in the flowsheet. Outcome: Progressing Towards Goal  Note: Fall Risk Interventions:                                Problem: Pressure Injury - Risk of  Goal: *Prevention of pressure injury  Description: Document Lino Scale and appropriate interventions in the flowsheet.   Outcome: Progressing Towards Goal  Note: Pressure Injury Interventions:  Sensory Interventions: Assess changes in LOC    Moisture Interventions: Absorbent underpads    Activity Interventions: Increase time out of bed, PT/OT evaluation    Mobility Interventions: HOB 30 degrees or less    Nutrition Interventions: Document food/fluid/supplement intake                     Problem: Pain  Goal: *Control of Pain  Outcome: Progressing Towards Goal     Problem: Patient Education: Go to Patient Education Activity  Goal: Patient/Family Education  Outcome: Progressing Towards Goal     Problem: Discharge Planning  Goal: *Discharge to safe environment  Outcome: Progressing Towards Goal

## 2023-04-28 ENCOUNTER — APPOINTMENT (OUTPATIENT)
Dept: NON INVASIVE DIAGNOSTICS | Age: 88
End: 2023-04-28
Attending: NURSE PRACTITIONER
Payer: MEDICARE

## 2023-04-28 LAB
ALBUMIN SERPL-MCNC: 3.2 G/DL (ref 3.5–5)
ALBUMIN/GLOB SERPL: 0.8 (ref 1.1–2.2)
ALP SERPL-CCNC: 80 U/L (ref 45–117)
ALT SERPL-CCNC: 16 U/L (ref 12–78)
ANION GAP SERPL CALC-SCNC: 5 MMOL/L (ref 5–15)
AST SERPL W P-5'-P-CCNC: 15 U/L (ref 15–37)
BILIRUB SERPL-MCNC: 0.6 MG/DL (ref 0.2–1)
BUN SERPL-MCNC: 6 MG/DL (ref 6–20)
BUN/CREAT SERPL: 9 (ref 12–20)
CA-I BLD-MCNC: 9.4 MG/DL (ref 8.5–10.1)
CHLORIDE SERPL-SCNC: 110 MMOL/L (ref 97–108)
CO2 SERPL-SCNC: 27 MMOL/L (ref 21–32)
CREAT SERPL-MCNC: 0.7 MG/DL (ref 0.55–1.02)
ECHO AO ASC DIAM: 3.4 CM
ECHO AO ASCENDING AORTA INDEX: 1.78 CM/M2
ECHO AO ROOT DIAM: 3.1 CM
ECHO AO ROOT INDEX: 1.62 CM/M2
ECHO AV AREA PEAK VELOCITY: 1.6 CM2
ECHO AV AREA VTI: 1.5 CM2
ECHO AV AREA/BSA PEAK VELOCITY: 0.8 CM2/M2
ECHO AV AREA/BSA VTI: 0.8 CM2/M2
ECHO AV MEAN GRADIENT: 7 MMHG
ECHO AV MEAN VELOCITY: 1.3 M/S
ECHO AV PEAK GRADIENT: 14 MMHG
ECHO AV PEAK VELOCITY: 1.9 M/S
ECHO AV VELOCITY RATIO: 0.53
ECHO AV VTI: 39.4 CM
ECHO LA AREA 2C: 20 CM2
ECHO LA AREA 4C: 19.8 CM2
ECHO LA DIAMETER INDEX: 1.83 CM/M2
ECHO LA DIAMETER: 3.5 CM
ECHO LA MAJOR AXIS: 5.3 CM
ECHO LA MINOR AXIS: 5.7 CM
ECHO LA TO AORTIC ROOT RATIO: 1.13
ECHO LA VOL BP: 60 ML (ref 22–52)
ECHO LA VOL/BSA BIPLANE: 31 ML/M2 (ref 16–34)
ECHO LV E' LATERAL VELOCITY: 15 CM/S
ECHO LV E' SEPTAL VELOCITY: 8 CM/S
ECHO LV EDV A2C: 65 ML
ECHO LV EDV A4C: 46 ML
ECHO LV EDV INDEX A4C: 24 ML/M2
ECHO LV EDV NDEX A2C: 34 ML/M2
ECHO LV EJECTION FRACTION A2C: 54 %
ECHO LV EJECTION FRACTION A4C: 56 %
ECHO LV EJECTION FRACTION BIPLANE: 56 % (ref 55–100)
ECHO LV ESV A2C: 29 ML
ECHO LV ESV A4C: 20 ML
ECHO LV ESV INDEX A2C: 15 ML/M2
ECHO LV ESV INDEX A4C: 10 ML/M2
ECHO LV FRACTIONAL SHORTENING: 29 % (ref 28–44)
ECHO LV INTERNAL DIMENSION DIASTOLE INDEX: 1.78 CM/M2
ECHO LV INTERNAL DIMENSION DIASTOLIC: 3.4 CM (ref 3.9–5.3)
ECHO LV INTERNAL DIMENSION SYSTOLIC INDEX: 1.26 CM/M2
ECHO LV INTERNAL DIMENSION SYSTOLIC: 2.4 CM
ECHO LV IVSD: 0.8 CM (ref 0.6–0.9)
ECHO LV MASS 2D: 84.9 G (ref 67–162)
ECHO LV MASS INDEX 2D: 44.4 G/M2 (ref 43–95)
ECHO LV POSTERIOR WALL DIASTOLIC: 1 CM (ref 0.6–0.9)
ECHO LV RELATIVE WALL THICKNESS RATIO: 0.59
ECHO LVOT AREA: 3.1 CM2
ECHO LVOT AV VTI INDEX: 0.48
ECHO LVOT DIAM: 2 CM
ECHO LVOT MEAN GRADIENT: 2 MMHG
ECHO LVOT PEAK GRADIENT: 4 MMHG
ECHO LVOT PEAK VELOCITY: 1 M/S
ECHO LVOT STROKE VOLUME INDEX: 31.2 ML/M2
ECHO LVOT SV: 59.7 ML
ECHO LVOT VTI: 19 CM
ECHO MV A VELOCITY: 0.46 M/S
ECHO MV E DECELERATION TIME (DT): 167 MS
ECHO MV E VELOCITY: 1.38 M/S
ECHO MV E/A RATIO: 3
ECHO MV E/E' LATERAL: 9.2
ECHO MV E/E' RATIO (AVERAGED): 13.23
ECHO MV E/E' SEPTAL: 17.25
ECHO PV MAX VELOCITY: 0.8 M/S
ECHO PV PEAK GRADIENT: 2 MMHG
ECHO RA AREA 4C: 13.6 CM2
ECHO RA END SYSTOLIC VOLUME APICAL 4 CHAMBER INDEX BSA: 16 ML/M2
ECHO RA VOLUME: 31 ML
ECHO RV BASAL DIMENSION: 3.3 CM
ECHO RV TAPSE: 2 CM (ref 1.7–?)
ECHO TV REGURGITANT MAX VELOCITY: 2.57 M/S
ECHO TV REGURGITANT PEAK GRADIENT: 26 MMHG
ERYTHROCYTE [DISTWIDTH] IN BLOOD BY AUTOMATED COUNT: 13.4 % (ref 11.5–14.5)
GLOBULIN SER CALC-MCNC: 4 G/DL (ref 2–4)
GLUCOSE SERPL-MCNC: 115 MG/DL (ref 65–100)
HCT VFR BLD AUTO: 34.9 % (ref 35–47)
HGB BLD-MCNC: 11.7 G/DL (ref 11.5–16)
MCH RBC QN AUTO: 30.3 PG (ref 26–34)
MCHC RBC AUTO-ENTMCNC: 33.5 G/DL (ref 30–36.5)
MCV RBC AUTO: 90.4 FL (ref 80–99)
NRBC # BLD: 0 K/UL (ref 0–0.01)
NRBC BLD-RTO: 0 PER 100 WBC
PLATELET # BLD AUTO: 289 K/UL (ref 150–400)
PMV BLD AUTO: 9.4 FL (ref 8.9–12.9)
POTASSIUM SERPL-SCNC: 3.3 MMOL/L (ref 3.5–5.1)
PROT SERPL-MCNC: 7.2 G/DL (ref 6.4–8.2)
RBC # BLD AUTO: 3.86 M/UL (ref 3.8–5.2)
SODIUM SERPL-SCNC: 142 MMOL/L (ref 136–145)
TSH SERPL DL<=0.05 MIU/L-ACNC: 3.86 UIU/ML (ref 0.36–3.74)
WBC # BLD AUTO: 8.9 K/UL (ref 3.6–11)

## 2023-04-28 PROCEDURE — 85027 COMPLETE CBC AUTOMATED: CPT

## 2023-04-28 PROCEDURE — 74011250637 HC RX REV CODE- 250/637: Performed by: FAMILY MEDICINE

## 2023-04-28 PROCEDURE — 93306 TTE W/DOPPLER COMPLETE: CPT

## 2023-04-28 PROCEDURE — 84443 ASSAY THYROID STIM HORMONE: CPT

## 2023-04-28 PROCEDURE — C9113 INJ PANTOPRAZOLE SODIUM, VIA: HCPCS | Performed by: FAMILY MEDICINE

## 2023-04-28 PROCEDURE — 80053 COMPREHEN METABOLIC PANEL: CPT

## 2023-04-28 PROCEDURE — 74011250636 HC RX REV CODE- 250/636: Performed by: FAMILY MEDICINE

## 2023-04-28 PROCEDURE — 74011250637 HC RX REV CODE- 250/637: Performed by: INTERNAL MEDICINE

## 2023-04-28 PROCEDURE — 65270000029 HC RM PRIVATE

## 2023-04-28 PROCEDURE — 36415 COLL VENOUS BLD VENIPUNCTURE: CPT

## 2023-04-28 PROCEDURE — 05HY33Z INSERTION OF INFUSION DEVICE INTO UPPER VEIN, PERCUTANEOUS APPROACH: ICD-10-PCS | Performed by: FAMILY MEDICINE

## 2023-04-28 PROCEDURE — 74011000250 HC RX REV CODE- 250: Performed by: FAMILY MEDICINE

## 2023-04-28 RX ORDER — HYDROCORTISONE ACETATE 25 MG/1
25 SUPPOSITORY RECTAL EVERY 12 HOURS
Status: DISCONTINUED | OUTPATIENT
Start: 2023-04-28 | End: 2023-04-29 | Stop reason: HOSPADM

## 2023-04-28 RX ORDER — HYDROXYZINE PAMOATE 25 MG/1
25 CAPSULE ORAL
Status: DISCONTINUED | OUTPATIENT
Start: 2023-04-28 | End: 2023-04-29 | Stop reason: HOSPADM

## 2023-04-28 RX ORDER — POLYETHYLENE GLYCOL 3350 17 G/17G
17 POWDER, FOR SOLUTION ORAL DAILY
Status: DISCONTINUED | OUTPATIENT
Start: 2023-04-28 | End: 2023-04-29 | Stop reason: HOSPADM

## 2023-04-28 RX ADMIN — POLYETHYLENE GLYCOL 3350 17 G: 17 POWDER, FOR SOLUTION ORAL at 13:18

## 2023-04-28 RX ADMIN — HYDROCORTISONE ACETATE 25 MG: 25 SUPPOSITORY RECTAL at 22:52

## 2023-04-28 RX ADMIN — HYDROXYZINE PAMOATE 25 MG: 25 CAPSULE ORAL at 13:18

## 2023-04-28 RX ADMIN — SODIUM CHLORIDE, PRESERVATIVE FREE 40 MG: 5 INJECTION INTRAVENOUS at 09:58

## 2023-04-28 RX ADMIN — SODIUM CHLORIDE 75 ML/HR: 9 INJECTION, SOLUTION INTRAVENOUS at 22:53

## 2023-04-28 RX ADMIN — SODIUM CHLORIDE 75 ML/HR: 9 INJECTION, SOLUTION INTRAVENOUS at 09:58

## 2023-04-28 RX ADMIN — HYDROCORTISONE ACETATE 25 MG: 25 SUPPOSITORY RECTAL at 13:19

## 2023-04-28 NOTE — PROGRESS NOTES
General Daily Progress Note          Patient Name:   Татьяна Curry       YOB: 1932       Age:  80 y.o. Admit Date: 4/25/2023      Subjective:     Patient is a 80y.o. year old female history of advanced dementia anxiety and depression and emergency room because of rectal bleed  And living at the assisted living facility nurse went to change her diaper found a lot of bright red blood came to emergency room seen by the ER physician recommended patient to be admitted for GI bleed GI was consulted by the ER physician patient has no nausea vomiting diarrhea no constipation no fever no chills        Patient today resting in the bed not in distress     4/27  Pt is seen in bed, more calm today. ROS is difficult to obtain as patient remains confused at baseline. Had episode of atrial fibrillation last night with slow ventricular rate. Telemetry box reads atrial flutter 41-78    4/28  Pt is resting in bed, in no acute distress. She says her eye feels better and denies chest pain or SOB. Bradycardia in the 30s last night reported and cardiology will be seeing pt today.         Objective:     Visit Vitals  BP (!) 155/74 (BP 1 Location: Right upper arm, BP Patient Position: At rest)   Pulse 90   Temp 98.1 °F (36.7 °C)   Resp 18   Ht 5' 6\" (1.676 m)   Wt 81.6 kg (180 lb)   SpO2 94%   BMI 29.05 kg/m²        Recent Results (from the past 24 hour(s))   GLUCOSE, POC    Collection Time: 04/27/23  2:27 PM   Result Value Ref Range    Glucose (POC) 99 65 - 100 mg/dL    Performed by [facility]    CBC W/O DIFF    Collection Time: 04/28/23  8:45 AM   Result Value Ref Range    WBC 8.9 3.6 - 11.0 K/uL    RBC 3.86 3.80 - 5.20 M/uL    HGB 11.7 11.5 - 16.0 g/dL    HCT 34.9 (L) 35.0 - 47.0 %    MCV 90.4 80.0 - 99.0 FL    MCH 30.3 26.0 - 34.0 PG    MCHC 33.5 30.0 - 36.5 g/dL    RDW 13.4 11.5 - 14.5 %    PLATELET 469 580 - 787 K/uL    MPV 9.4 8.9 - 12.9 FL    NRBC 0.0 0.0  WBC    ABSOLUTE NRBC 0.00 0.00 - 0.01 K/uL   METABOLIC PANEL, COMPREHENSIVE    Collection Time: 04/28/23  8:45 AM   Result Value Ref Range    Sodium 142 136 - 145 mmol/L    Potassium 3.3 (L) 3.5 - 5.1 mmol/L    Chloride 110 (H) 97 - 108 mmol/L    CO2 27 21 - 32 mmol/L    Anion gap 5 5 - 15 mmol/L    Glucose 115 (H) 65 - 100 mg/dL    BUN 6 6 - 20 mg/dL    Creatinine 0.70 0.55 - 1.02 mg/dL    BUN/Creatinine ratio 9 (L) 12 - 20      eGFR >60 >60 ml/min/1.73m2    Calcium 9.4 8.5 - 10.1 mg/dL    Bilirubin, total 0.6 0.2 - 1.0 mg/dL    AST (SGOT) 15 15 - 37 U/L    ALT (SGPT) 16 12 - 78 U/L    Alk. phosphatase 80 45 - 117 U/L    Protein, total 7.2 6.4 - 8.2 g/dL    Albumin 3.2 (L) 3.5 - 5.0 g/dL    Globulin 4.0 2.0 - 4.0 g/dL    A-G Ratio 0.8 (L) 1.1 - 2.2       [unfilled]      Review of Systems    Constitutional: Negative for chills and fever. HENT: Negative. Eyes: Negative. Respiratory: Negative. Cardiovascular: Negative. Gastrointestinal: Negative for abdominal pain and nausea. Skin: Negative. Neurological: Negative. Physical Exam:      Constitutional: pt is oriented to person, place, and time. HENT:   Head: Normocephalic and atraumatic. Eyes: Pupils are equal, round, and reactive to light. EOM are normal.   Cardiovascular: atrial flutter, alternating estrella-regular rate, and normal heart sounds. Pulmonary/Chest: Breath sounds normal. No wheezes. No rales. Exhibits no tenderness. Abdominal: Soft. Bowel sounds are normal. There is no abdominal tenderness. There is no rebound and no guarding. Musculoskeletal: Normal range of motion. Neurological: pt is alert and oriented to person, place, and time. CT ABD PELV W WO CONT   Final Result   No acute intraperitoneal process is identified. Please see above for additional nonemergent incidental findings.                   Recent Results (from the past 24 hour(s))   GLUCOSE, POC    Collection Time: 04/27/23  2:27 PM   Result Value Ref Range    Glucose (POC) 99 65 - 100 mg/dL    Performed by [facility]    CBC W/O DIFF    Collection Time: 04/28/23  8:45 AM   Result Value Ref Range    WBC 8.9 3.6 - 11.0 K/uL    RBC 3.86 3.80 - 5.20 M/uL    HGB 11.7 11.5 - 16.0 g/dL    HCT 34.9 (L) 35.0 - 47.0 %    MCV 90.4 80.0 - 99.0 FL    MCH 30.3 26.0 - 34.0 PG    MCHC 33.5 30.0 - 36.5 g/dL    RDW 13.4 11.5 - 14.5 %    PLATELET 664 061 - 605 K/uL    MPV 9.4 8.9 - 12.9 FL    NRBC 0.0 0.0  WBC    ABSOLUTE NRBC 0.00 0.00 - 1.03 K/uL   METABOLIC PANEL, COMPREHENSIVE    Collection Time: 04/28/23  8:45 AM   Result Value Ref Range    Sodium 142 136 - 145 mmol/L    Potassium 3.3 (L) 3.5 - 5.1 mmol/L    Chloride 110 (H) 97 - 108 mmol/L    CO2 27 21 - 32 mmol/L    Anion gap 5 5 - 15 mmol/L    Glucose 115 (H) 65 - 100 mg/dL    BUN 6 6 - 20 mg/dL    Creatinine 0.70 0.55 - 1.02 mg/dL    BUN/Creatinine ratio 9 (L) 12 - 20      eGFR >60 >60 ml/min/1.73m2    Calcium 9.4 8.5 - 10.1 mg/dL    Bilirubin, total 0.6 0.2 - 1.0 mg/dL    AST (SGOT) 15 15 - 37 U/L    ALT (SGPT) 16 12 - 78 U/L    Alk. phosphatase 80 45 - 117 U/L    Protein, total 7.2 6.4 - 8.2 g/dL    Albumin 3.2 (L) 3.5 - 5.0 g/dL    Globulin 4.0 2.0 - 4.0 g/dL    A-G Ratio 0.8 (L) 1.1 - 2.2         Results       ** No results found for the last 336 hours. **             Labs:     Recent Labs     04/28/23  0845 04/27/23  0823   WBC 8.9 9.2   HGB 11.7 11.1*   HCT 34.9* 33.7*    273       Recent Labs     04/28/23  0845 04/27/23  0823 04/26/23  0605    137 140   K 3.3* 3.6 3.7   * 109* 109*   CO2 27 23 28   BUN 6 8 13   CREA 0.70 0.62 0.57   * 106* 95   CA 9.4 8.9 8.6       Recent Labs     04/28/23  0845 04/27/23  0823 04/26/23  0605   ALT 16 17 17   AP 80 78 69   TBILI 0.6 0.5 0.4   TP 7.2 6.5 6.0*   ALB 3.2* 3.0* 2.7*   GLOB 4.0 3.5 3.3       Recent Labs     04/25/23  1455   INR 1.1   PTP 14.7*        No results for input(s): FE, TIBC, PSAT, FERR in the last 72 hours.    No results found for: FOL, RBCF   No results for input(s): PH, PCO2, PO2 in the last 72 hours. No results for input(s): CPK, CKNDX, TROIQ in the last 72 hours.     No lab exists for component: CPKMB  No results found for: CHOL, CHOLX, CHLST, CHOLV, HDL, HDLP, LDL, LDLC, DLDLP, TGLX, TRIGL, TRIGP, CHHD, CHHDX  Lab Results   Component Value Date/Time    Glucose (POC) 99 04/27/2023 02:27 PM     Lab Results   Component Value Date/Time    Color Yellow/Straw 12/15/2022 02:49 PM    Appearance Hazy (A) 12/15/2022 02:49 PM    Specific gravity 1.025 12/15/2022 02:49 PM    pH (UA) 5.0 12/15/2022 02:49 PM    Protein Negative 12/15/2022 02:49 PM    Glucose Negative 12/15/2022 02:49 PM    Ketone Negative 12/15/2022 02:49 PM    Bilirubin Negative 12/15/2022 02:49 PM    Urobilinogen 1.0 12/15/2022 02:49 PM    Nitrites Positive (A) 12/15/2022 02:49 PM    Leukocyte Esterase Large (A) 12/15/2022 02:49 PM    Bacteria 1+ (A) 12/15/2022 02:49 PM    WBC >100 (H) 12/15/2022 02:49 PM    RBC 5-10 12/15/2022 02:49 PM         Assessment:     Acute GI bleed  Rectal bleed  Advanced dementia  Atrial flutter/resolve  Chronic A-fib      Plan:     Monitor H&H and follow-up with GI  Follow up with cardiology      Current Facility-Administered Medications:     0.9% sodium chloride infusion, 75 mL/hr, IntraVENous, CONTINUOUS, Alem Galan MD, Last Rate: 75 mL/hr at 04/28/23 0958, 75 mL/hr at 04/28/23 0958    acetaminophen (TYLENOL) tablet 650 mg, 650 mg, Oral, Q6H PRN, 650 mg at 04/27/23 1717 **OR** acetaminophen (TYLENOL) suppository 650 mg, 650 mg, Rectal, Q6H PRN, Alem Galan MD    polyethylene glycol (MIRALAX) packet 17 g, 17 g, Oral, DAILY PRN, Alem Galan MD    ondansetron (ZOFRAN ODT) tablet 4 mg, 4 mg, Oral, Q8H PRN **OR** ondansetron (ZOFRAN) injection 4 mg, 4 mg, IntraVENous, Q6H PRN, Alem Galan MD    pantoprazole (PROTONIX) 40 mg in 0.9% sodium chloride 10 mL injection, 40 mg, IntraVENous, DAILY, Alem Galan MD, 40 mg at 04/28/23 5777

## 2023-04-28 NOTE — PROGRESS NOTES
Spoke with jose alfredo about possible discharge pending cardiology clearance. Requested Dc summary. Discharge summary faxed to jose alfredo at [phone]. Per Saurav Rosales, liaison at [facility] if patient does not come today, she is able to come over weekend. Spoke with son and asking about how she will transport. Explained I can see if insurance will cover transport and inform him. 1545: patient going off floor for echo. Likely discharge tomorrow. Informed son. Transport setup for will call through [facility].  tomorrow to call [facility] with name for  time.  Please call son Migdalia Hernandez at [phone] to inform of discharge

## 2023-04-28 NOTE — DISCHARGE SUMMARY
Discharge Summary       PATIENT ID: Huong Garcia  MRN: [id number]   YOB: 1932    DATE OF ADMISSION: 4/25/2023  2:33 PM    DATE OF DISCHARGE:   PRIMARY CARE PROVIDER: None     ATTENDING PHYSICIAN: Sophy Galan  DISCHARGING PROVIDER: Sophy Galan      CONSULTATIONS: IP CONSULT TO GASTROENTEROLOGY  IP CONSULT TO CARDIOLOGY    PROCEDURES/SURGERIES: * No surgery found *    ADMITTING DIAGNOSES:    Patient Active Problem List    Diagnosis Date Noted    GI bleed 04/25/2023    Acute GI bleeding 04/25/2023    UTI (urinary tract infection) 12/15/2022       DISCHARGE DIAGNOSES / PLAN:      Acute GI bleed  Rectal bleed  Advanced dementia  Atrial flutter/resolve  Chronic A-fib        DISCHARGE MEDICATIONS:  Current Discharge Medication List        CONTINUE these medications which have NOT CHANGED    Details   aspirin 81 mg chewable tablet Take 1 Tablet by mouth daily. atorvastatin (LIPITOR) 40 mg tablet Take 1 Tablet by mouth nightly. isosorbide mononitrate ER (IMDUR) 30 mg tablet Take 1 Tablet by mouth daily. omeprazole (PRILOSEC) 20 mg capsule Take 2 Capsules by mouth daily. PARoxetine CR (PAXIL-CR) 12.5 mg tablet Take 1 Tablet by mouth daily. spironolactone (ALDACTONE) 25 mg tablet Take 1 Tablet by mouth daily. traZODone (DESYREL) 50 mg tablet Take 0.5 Tablets by mouth nightly. cholecalciferol (Vitamin D3) (1000 Units /25 mcg) tablet Take 2 Tablets by mouth daily.            STOP taking these medications       acetaminophen (TYLENOL) 325 mg tablet Comments:   Reason for Stopping:         mineral oil-hydrophil petrolat (Aquaphor) ointment Comments:   Reason for Stopping:         docusate sodium (Colace) 100 mg capsule Comments:   Reason for Stopping:         metoprolol tartrate (LOPRESSOR) 50 mg tablet Comments:   Reason for Stopping:         potassium chloride SA (KLOR-CON M15) 15 mEq tablet Comments:   Reason for Stopping:         Senna 8.6 mg tablet Comments:   Reason for Stopping:                 NOTIFY YOUR PHYSICIAN FOR ANY OF THE FOLLOWING:   Fever over 101 degrees for 24 hours. Chest pain, shortness of breath, fever, chills, nausea, vomiting, diarrhea, change in mentation, falling, weakness, bleeding. Severe pain or pain not relieved by medications. Or, any other signs or symptoms that you may have questions about. DISPOSITION:  x  Home With:   OT  PT  HH  RN       Long term SNF/Inpatient Rehab    Independent/assisted living    Hospice    Other:       PATIENT CONDITION AT DISCHARGE: Stable      PHYSICAL EXAMINATION AT DISCHARGE:  General:          Alert, cooperative, no distress, appears stated age. HEENT:           Atraumatic, anicteric sclerae, pink conjunctivae                          No oral ulcers, mucosa moist, throat clear, dentition fair  Neck:               Supple, symmetrical  Lungs:             Clear to auscultation bilaterally. No Wheezing or Rhonchi. No rales. Chest wall:      No tenderness  No Accessory muscle use. Heart:              Regular  rhythm,  No  murmur   No edema  Abdomen:        Soft, non-tender. Not distended. Bowel sounds normal  Extremities:     No cyanosis. No clubbing,                            Skin turgor normal, Capillary refill normal  Skin:                Not pale. Not Jaundiced  No rashes   Psych:             Not anxious or agitated. Neurologic:      Alert, moves all extremities, answers questions appropriately and responds to commands     CT ABD PELV W WO CONT   Final Result   No acute intraperitoneal process is identified. Please see above for additional nonemergent incidental findings.                   Recent Results (from the past 24 hour(s))   GLUCOSE, POC    Collection Time: 04/27/23  2:27 PM   Result Value Ref Range    Glucose (POC) 99 65 - 100 mg/dL    Performed by Corinna Castro    CBC W/O DIFF    Collection Time: 04/28/23  8:45 AM   Result Value Ref Range    WBC 8.9 3.6 - 11.0 K/uL    RBC 3.86 3.80 - 5.20 M/uL    HGB 11.7 11.5 - 16.0 g/dL    HCT 34.9 (L) 35.0 - 47.0 %    MCV 90.4 80.0 - 99.0 FL    MCH 30.3 26.0 - 34.0 PG    MCHC 33.5 30.0 - 36.5 g/dL    RDW 13.4 11.5 - 14.5 %    PLATELET 462 753 - 180 K/uL    MPV 9.4 8.9 - 12.9 FL    NRBC 0.0 0.0  WBC    ABSOLUTE NRBC 0.00 0.00 - 0.92 K/uL   METABOLIC PANEL, COMPREHENSIVE    Collection Time: 04/28/23  8:45 AM   Result Value Ref Range    Sodium 142 136 - 145 mmol/L    Potassium 3.3 (L) 3.5 - 5.1 mmol/L    Chloride 110 (H) 97 - 108 mmol/L    CO2 27 21 - 32 mmol/L    Anion gap 5 5 - 15 mmol/L    Glucose 115 (H) 65 - 100 mg/dL    BUN 6 6 - 20 mg/dL    Creatinine 0.70 0.55 - 1.02 mg/dL    BUN/Creatinine ratio 9 (L) 12 - 20      eGFR >60 >60 ml/min/1.73m2    Calcium 9.4 8.5 - 10.1 mg/dL    Bilirubin, total 0.6 0.2 - 1.0 mg/dL    AST (SGOT) 15 15 - 37 U/L    ALT (SGPT) 16 12 - 78 U/L    Alk. phosphatase 80 45 - 117 U/L    Protein, total 7.2 6.4 - 8.2 g/dL    Albumin 3.2 (L) 3.5 - 5.0 g/dL    Globulin 4.0 2.0 - 4.0 g/dL    A-G Ratio 0.8 (L) 1.1 - 2.2            HOSPITAL COURSE:    Patient is a 80y.o. year old female history of advanced dementia anxiety and depression and emergency room because of rectal bleed  And living at the assisted living facility nurse went to change her diaper found a lot of bright red blood came to emergency room seen by the ER physician recommended patient to be admitted for GI bleed GI was consulted by the ER physician patient has no nausea vomiting diarrhea no constipation no fever no chills           Patient today resting in the bed not in distress     4/27  Pt is seen in bed, more calm today. ROS is difficult to obtain as patient remains confused at baseline. Had episode of atrial fibrillation last night with slow ventricular rate. Telemetry box reads atrial flutter 41-78     4/28  Pt is resting in bed, in no acute distress. She says her eye feels better and denies chest pain or SOB.    Bradycardia in the 30s last night reported and cardiology will be seeing pt today.       Cardiology consult is pending heart rates are normal today patient is alert awake confused because of advanced dementia not any distress hemoglobin stable no further work-up recommended by the GI medication reconciliation done time sharply 35 minutes 50% spent counseling and coordination of care      Signed:   Biju Cardenas MD  4/28/2023  11:48 AM

## 2023-04-28 NOTE — CONSULTS
CARDIOLOGY CONSULTATION    REASON FOR CONSULT: Bradycardia    REQUESTING PROVIDER: Dr. Polina Hernandes:  Low heart rate    HISTORY OF PRESENT ILLNESS:  Leno Caraballo is a 80y.o. year-old female with past medical history significant for advanced dementia, anxiety, and depression who was evaluated today due to atrial fibrillation with slow ventricular response at night. She was brought in from the nursing home due to bright red blood in her brief. She is confused but able to converse easily. She denies any symptoms, no chest pain or shortness of breath. No palpitations or dizziness. She is mainly wheelchair bound. No history of atrial fibrillation per her or her son but it does appear she was in atrial fibrillation on last ecg a few months ago. No cardiac work up. Records from hospital admission course thus far reviewed. Telemetry reviewed, atrial fibrillation    INPATIENT MEDICATIONS:  Home medications reviewed.     Current Facility-Administered Medications:     hydrOXYzine pamoate (VISTARIL) capsule 25 mg, 25 mg, Oral, Q6H PRN, Alem Galan MD, 25 mg at 04/28/23 1318    hydrocortisone (ANUSOL-HC) suppository 25 mg, 25 mg, Rectal, Q12H, Checo Basilio MD, 25 mg at 04/28/23 1319    polyethylene glycol (MIRALAX) packet 17 g, 17 g, Oral, DAILY, Carroll, Jaron Thompson MD, 17 g at 04/28/23 1318    0.9% sodium chloride infusion, 75 mL/hr, IntraVENous, CONTINUOUS, Alem Galan MD, Last Rate: 75 mL/hr at 04/28/23 0958, 75 mL/hr at 04/28/23 0958    acetaminophen (TYLENOL) tablet 650 mg, 650 mg, Oral, Q6H PRN, 650 mg at 04/27/23 1717 **OR** acetaminophen (TYLENOL) suppository 650 mg, 650 mg, Rectal, Q6H PRN, Alem Galan MD    polyethylene glycol (MIRALAX) packet 17 g, 17 g, Oral, DAILY PRN, Alem Galan MD    ondansetron (ZOFRAN ODT) tablet 4 mg, 4 mg, Oral, Q8H PRN **OR** ondansetron (ZOFRAN) injection 4 mg, 4 mg, IntraVENous, Q6H PRN, Alem Galan MD    pantoprazole (PROTONIX) 40 mg in 0.9% sodium chloride 10 mL injection, 40 mg, IntraVENous, DAILY, Alem Galan MD, 40 mg at 04/28/23 5725     ALLERGIES:  Allergies reviewed with the patient, No Known Allergies . FAMILY HISTORY:  Family history reviewed. SOCIAL HISTORY:  Notable for no tobacco use, no heavy alcohol or illicit drug use. REVIEW OF SYSTEMS:  Complete review of systems performed, pertinents noted above, all other systems are negative. PHYSICAL EXAMINATION:    General:  Alert, in NAD  Cardiovascular:  Irregularly irregular  Respiratory:  Lungs are diminished  Abdomen:  Soft, nontender  Extremities:  no lower extremity edema  Skin:  Dry, warm  Psych:  Pleasant but confused    Visit Vitals  BP (!) 155/74 (BP 1 Location: Right upper arm, BP Patient Position: At rest)   Pulse 90   Temp 98.1 °F (36.7 °C)   Resp 18   Ht 5' 6\" (1.676 m)   Wt 81.6 kg (180 lb)   SpO2 94%   BMI 29.05 kg/m²       Recent labs results and imaging reviewed. Discussed case with Dr. Alfredo Drummond and our impression and recommendations are as follows:  Atrial fibrillation, rate controlled at present but some slow rates noted  Agree with holding Metoprolol  No indication for PPM at present  BP stable  Check echo to assess EF/valves  No [address] for now given GI bleeding  Elevated blood pressure, closely trend home meds resumed  Possible GI bleeding, conservative management per GI  Dementia, per primary    Thank you for involving us in the care of this patient. Please do not hesitate to call me or Dr. Alfredo Drummond if additional questions arise.     Supriya Thao NP  4/28/2023

## 2023-04-28 NOTE — PROGRESS NOTES
Progress Note    Patient: Marlena Guy MRN: [id number]  SSN: [id number]    YOB: 1932  Age: 80 y.o. Sex: female      Admit Date: 4/25/2023    LOS: 3 days     Subjective:     Patient is comfortable denies any abdominal pain or further passage of blood in the stool her hemoglobin is stable. Objective:     Vitals:    04/27/23 1600 04/27/23 2028 04/28/23 0255 04/28/23 0751   BP:  137/72 138/63 (!) 155/74   Pulse: 82 85 74 90   Resp:  18 18 18   Temp:  98.1 °F (36.7 °C) 97.3 °F (36.3 °C) 98.1 °F (36.7 °C)   SpO2:  94% 100% 94%   Weight:       Height:            Intake and Output:  Current Shift: No intake/output data recorded. Last three shifts: 04/26 1901 - 04/28 0700  In: 2000 [I.V.:2000]  Out: 2550 [Urine:2550]    Physical Exam:   Skin:  Extremities and face reveal no rashes. No ayala erythema. HEENT: Sclerae anicteric. Extra-occular muscles are intact. No abnormal pigmentation of the lips. The neck is supple. Cardiovascular: Regular rate and rhythm. Respiratory:  Comfortable breathing with no accessory muscle use. GI:  Abdomen nondistended, soft, and nontender. No enlargement of the liver or spleen. No masses palpable. Rectal:  Deferred  Musculoskeletal: Extremities have good range of motion. Neurological:  Gross memory appears intact. Patient is alert and oriented. Psychiatric:  Mood appears appropriate with judgement intact.   Lymphatic:  No visible adenopathy      Lab/Data Review:  Recent Results (from the past 24 hour(s))   GLUCOSE, POC    Collection Time: 04/27/23  2:27 PM   Result Value Ref Range    Glucose (POC) 99 65 - 100 mg/dL    Performed by Christina Dorado    CBC W/O DIFF    Collection Time: 04/28/23  8:45 AM   Result Value Ref Range    WBC 8.9 3.6 - 11.0 K/uL    RBC 3.86 3.80 - 5.20 M/uL    HGB 11.7 11.5 - 16.0 g/dL    HCT 34.9 (L) 35.0 - 47.0 %    MCV 90.4 80.0 - 99.0 FL    MCH 30.3 26.0 - 34.0 PG    MCHC 33.5 30.0 - 36.5 g/dL    RDW 13.4 11.5 - 14.5 %    PLATELET 051 150 - 400 K/uL    MPV 9.4 8.9 - 12.9 FL    NRBC 0.0 0.0  WBC    ABSOLUTE NRBC 0.00 0.00 - 0.08 K/uL   METABOLIC PANEL, COMPREHENSIVE    Collection Time: 04/28/23  8:45 AM   Result Value Ref Range    Sodium 142 136 - 145 mmol/L    Potassium 3.3 (L) 3.5 - 5.1 mmol/L    Chloride 110 (H) 97 - 108 mmol/L    CO2 27 21 - 32 mmol/L    Anion gap 5 5 - 15 mmol/L    Glucose 115 (H) 65 - 100 mg/dL    BUN 6 6 - 20 mg/dL    Creatinine 0.70 0.55 - 1.02 mg/dL    BUN/Creatinine ratio 9 (L) 12 - 20      eGFR >60 >60 ml/min/1.73m2    Calcium 9.4 8.5 - 10.1 mg/dL    Bilirubin, total 0.6 0.2 - 1.0 mg/dL    AST (SGOT) 15 15 - 37 U/L    ALT (SGPT) 16 12 - 78 U/L    Alk. phosphatase 80 45 - 117 U/L    Protein, total 7.2 6.4 - 8.2 g/dL    Albumin 3.2 (L) 3.5 - 5.0 g/dL    Globulin 4.0 2.0 - 4.0 g/dL    A-G Ratio 0.8 (L) 1.1 - 2.2     TSH 3RD GENERATION    Collection Time: 04/28/23  9:11 AM   Result Value Ref Range    TSH 3.86 (H) 0.36 - 3.74 uIU/mL              CT ABD PELV W WO CONT   Final Result   No acute intraperitoneal process is identified. Please see above for additional nonemergent incidental findings. Assessment:     Active Problems:    GI bleed (4/25/2023)      Acute GI bleeding (4/25/2023)        Plan:   I would recommend empiric therapy with PPI, MiraLAX and Anusol suppositories if there is a drop in hemoglobin or further overt bleeding we will consider a flexible sigmoidoscopy this can be done as an outpatient. Keep the stool soft and drink plenty of water.   Thank you for allowing me to participate in this patients care    Signed By: Danny Singer MD     April 28, 2023

## 2023-04-28 NOTE — PROGRESS NOTES
Perfect-served Dr. Kassy Hartman regarding cardiology consult. Stated his team would be up to see her soon.

## 2023-04-28 NOTE — PROGRESS NOTES
Attempted to call Cardiology NP Ms. Flori Washburn regarding discharge clearance for this patient.  Will await call back 0

## 2023-04-28 NOTE — PROGRESS NOTES
Problem: Falls - Risk of  Goal: *Absence of Falls  Description: Document Maki Edgewood Fall Risk and appropriate interventions in the flowsheet. Outcome: Progressing Towards Goal  Note: Fall Risk Interventions:                                Problem: Pressure Injury - Risk of  Goal: *Prevention of pressure injury  Description: Document Lino Scale and appropriate interventions in the flowsheet.   Outcome: Progressing Towards Goal  Note: Pressure Injury Interventions:  Sensory Interventions: Assess changes in LOC    Moisture Interventions: Absorbent underpads    Activity Interventions: Increase time out of bed, PT/OT evaluation    Mobility Interventions: HOB 30 degrees or less, PT/OT evaluation    Nutrition Interventions: Document food/fluid/supplement intake    Friction and Shear Interventions: Minimize layers, HOB 30 degrees or less                Problem: Pain  Goal: *Control of Pain  Outcome: Progressing Towards Goal     Problem: Discharge Planning  Goal: *Discharge to safe environment  Outcome: Progressing Towards Goal

## 2023-04-28 NOTE — PROGRESS NOTES
CM reviewed chart. Patient planned for endo procedure, monitoring labs. When medically stable, plans are to return back to [facility].

## 2023-04-28 NOTE — PROGRESS NOTES
Pt lost iv access at around 11.10pm,I tried and my charge RN too but not successful. supervisor requested to try for us. she came over at 1158pm but then pt was already asleep after staying awake for along time since start of this shift. she advised me to call her once the pt is awake but said after 3am she is usually very busy and if so it will wait till morning

## 2023-04-28 NOTE — PROGRESS NOTES
Bedside, Verbal, and Written shift change report given to [address]   (oncoming nurse) by Zina Ellis   (offgoing nurse). Report included the following information SBAR.   Bed alarm engaged

## 2023-04-28 NOTE — PROGRESS NOTES
Problem: Falls - Risk of  Goal: *Absence of Falls  Description: Document Lisandrodarrell Wyatt Fall Risk and appropriate interventions in the flowsheet.   Outcome: Progressing Towards Goal  Note: Fall Risk Interventions:

## 2023-04-28 NOTE — DISCHARGE INSTRUCTIONS
Discharge Instructions       PATIENT ID: Nikhil Kirkland  MRN: [id number]   YOB: 1932    DATE OF ADMISSION: [unfilled]    DATE OF DISCHARGE: 4/28/2023    PRIMARY CARE PROVIDER: @PCP@     ATTENDING PHYSICIAN: [unfilled]  DISCHARGING PROVIDER: Chantelle Henao MD    To contact this individual call [phone] and ask the  to page. If unavailable ask to be transferred the Adult Hospitalist Department. DISCHARGE DIAGNOSES bleed I bleed    CONSULTATIONS: [unfilled]    PROCEDURES/SURGERIES: * No surgery found *    PENDING TEST RESULTS:   At the time of discharge the following test results are still pending: None    [facility]MENTS[facility]   [facility]     ADDITIONAL CARE RECOMMENDATIONS: Monitor H&H    DIET: Resume previous diet      ACTIVITY: Activity as tolerated    Wound care: Wound Care Order: submitted to [facility] Please view [email or web address]    EQUIPMENT needed: ***      DISCHARGE MEDICATIONS:   See Medication Reconciliation Form    It is important that you take the medication exactly as they are prescribed. Keep your medication in the bottles provided by the pharmacist and keep a list of the medication names, dosages, and times to be taken in your wallet. Do not take other medications without consulting your doctor. NOTIFY YOUR PHYSICIAN FOR ANY OF THE FOLLOWING:   Fever over 101 degrees for 24 hours. Chest pain, shortness of breath, fever, chills, nausea, vomiting, diarrhea, change in mentation, falling, weakness, bleeding. Severe pain or pain not relieved by medications. Or, any other signs or symptoms that you may have questions about.       DISPOSITION:    Home With:   OT  PT  HH  RN       SNF/Inpatient Rehab/LTAC    Independent/assisted living    Hospice    Other:         PROBLEM LIST Updated:  Yes ***       Signed:   Chantelle Henao MD  4/28/2023  11:48 AM

## 2023-04-28 NOTE — PROCEDURES
Vascular Access Team Consult Note: received consult from primary care nurse for PIV placement, client admitted with GI bleed and Afib. Ultrasound (US)-guided Peripheral IV (PIV) Placement: see EHR flowsheet and Avatar/assessments for additional procedure and vascular access device details. 20 g 1.75 inch PIV placed with US-guidance x 1 attempt in left anterior mid-forearm cephalic vein. Brisk blood return noted and flushed easily with 10 mL NS. Patient tolerated well, no complaints. Client's son, Candi Ivey, at bedside. Patient continues to benefit from US-guided PIV placement due to difficult IV placement, limited suitable veins for cannulation, vein depth, and small vein diameter size. Primary care nurse, [address] [place] [address], notified.      Carmen Ty RN

## 2023-04-29 VITALS
SYSTOLIC BLOOD PRESSURE: 140 MMHG | DIASTOLIC BLOOD PRESSURE: 64 MMHG | HEIGHT: 66 IN | BODY MASS INDEX: 28.93 KG/M2 | OXYGEN SATURATION: 96 % | TEMPERATURE: 97.5 F | RESPIRATION RATE: 17 BRPM | WEIGHT: 180 LBS | HEART RATE: 65 BPM

## 2023-04-29 PROCEDURE — 74011250637 HC RX REV CODE- 250/637: Performed by: INTERNAL MEDICINE

## 2023-04-29 PROCEDURE — C9113 INJ PANTOPRAZOLE SODIUM, VIA: HCPCS | Performed by: FAMILY MEDICINE

## 2023-04-29 PROCEDURE — 74011250636 HC RX REV CODE- 250/636: Performed by: FAMILY MEDICINE

## 2023-04-29 PROCEDURE — 74011250637 HC RX REV CODE- 250/637: Performed by: FAMILY MEDICINE

## 2023-04-29 PROCEDURE — 74011000250 HC RX REV CODE- 250: Performed by: FAMILY MEDICINE

## 2023-04-29 RX ADMIN — SODIUM CHLORIDE, PRESERVATIVE FREE 40 MG: 5 INJECTION INTRAVENOUS at 10:21

## 2023-04-29 RX ADMIN — HYDROXYZINE PAMOATE 25 MG: 25 CAPSULE ORAL at 01:06

## 2023-04-29 NOTE — PROGRESS NOTES
[facility]ed [facility] and spoke with Alisha, nurse for report on pt. Made nurse aware about admission dx, dc orders, and pt drowsy due to PRN hydroxyzine dose given per night shift.

## 2023-04-29 NOTE — PROGRESS NOTES
Patient to discharge back to [facility] today. CM spoke with nurse Tabitha Reeves. They are ready to receive her back. CM informed son. He is in agreement. Transport set up with [facility]. Pickup time between 3:30-4pm. Nurse to call report to [phone]. Discharge plan of care/case management plan validated with provider discharge order. Medicare pt has received, reviewed, and signed 2nd IM letter informing them of their right to appeal the discharge. Signed copied has been placed on pt bedside chart.

## 2023-04-29 NOTE — DISCHARGE SUMMARY
Discharge Summary       PATIENT ID: Shelley Meza  MRN: [id number]   YOB: 1932    DATE OF ADMISSION: 4/25/2023  2:33 PM    DATE OF DISCHARGE:   PRIMARY CARE PROVIDER: None     ATTENDING PHYSICIAN: Jourdan Galan  DISCHARGING PROVIDER: Jourdan Galan      CONSULTATIONS: IP CONSULT TO GASTROENTEROLOGY  IP CONSULT TO CARDIOLOGY    PROCEDURES/SURGERIES: * No surgery found *    ADMITTING DIAGNOSES:    Patient Active Problem List    Diagnosis Date Noted    GI bleed 04/25/2023    Acute GI bleeding 04/25/2023    UTI (urinary tract infection) 12/15/2022       DISCHARGE DIAGNOSES / PLAN:      Acute GI bleed  Rectal bleed  Advanced dementia  Atrial flutter/resolve  Chronic A-fib        DISCHARGE MEDICATIONS:  Current Discharge Medication List        CONTINUE these medications which have NOT CHANGED    Details   aspirin 81 mg chewable tablet Take 1 Tablet by mouth daily. atorvastatin (LIPITOR) 40 mg tablet Take 1 Tablet by mouth nightly. isosorbide mononitrate ER (IMDUR) 30 mg tablet Take 1 Tablet by mouth daily. omeprazole (PRILOSEC) 20 mg capsule Take 2 Capsules by mouth daily. PARoxetine CR (PAXIL-CR) 12.5 mg tablet Take 1 Tablet by mouth daily. spironolactone (ALDACTONE) 25 mg tablet Take 1 Tablet by mouth daily. traZODone (DESYREL) 50 mg tablet Take 0.5 Tablets by mouth nightly. cholecalciferol (Vitamin D3) (1000 Units /25 mcg) tablet Take 2 Tablets by mouth daily.            STOP taking these medications       acetaminophen (TYLENOL) 325 mg tablet Comments:   Reason for Stopping:         mineral oil-hydrophil petrolat (Aquaphor) ointment Comments:   Reason for Stopping:         docusate sodium (Colace) 100 mg capsule Comments:   Reason for Stopping:         metoprolol tartrate (LOPRESSOR) 50 mg tablet Comments:   Reason for Stopping:         potassium chloride SA (KLOR-CON M15) 15 mEq tablet Comments:   Reason for Stopping:         Senna 8.6 mg tablet Comments:   Reason for Stopping:                 NOTIFY YOUR PHYSICIAN FOR ANY OF THE FOLLOWING:   Fever over 101 degrees for 24 hours. Chest pain, shortness of breath, fever, chills, nausea, vomiting, diarrhea, change in mentation, falling, weakness, bleeding. Severe pain or pain not relieved by medications. Or, any other signs or symptoms that you may have questions about. DISPOSITION:  x  Home With:   OT  PT  HH  RN       Long term SNF/Inpatient Rehab    Independent/assisted living    Hospice    Other:       PATIENT CONDITION AT DISCHARGE: Stable      PHYSICAL EXAMINATION AT DISCHARGE:  General:          Alert, cooperative, no distress, appears stated age. HEENT:           Atraumatic, anicteric sclerae, pink conjunctivae                          No oral ulcers, mucosa moist, throat clear, dentition fair  Neck:               Supple, symmetrical  Lungs:             Clear to auscultation bilaterally. No Wheezing or Rhonchi. No rales. Chest wall:      No tenderness  No Accessory muscle use. Heart:              Regular  rhythm,  No  murmur   No edema  Abdomen:        Soft, non-tender. Not distended. Bowel sounds normal  Extremities:     No cyanosis. No clubbing,                            Skin turgor normal, Capillary refill normal  Skin:                Not pale. Not Jaundiced  No rashes   Psych:             Not anxious or agitated. Neurologic:      Alert, moves all extremities, answers questions appropriately and responds to commands     CT ABD PELV W WO CONT   Final Result   No acute intraperitoneal process is identified. Please see above for additional nonemergent incidental findings.                   Recent Results (from the past 24 hour(s))   ECHO ADULT COMPLETE    Collection Time: 04/28/23  4:21 PM   Result Value Ref Range    LV EDV A2C 65 mL    LV EDV A4C 46 mL    LV ESV A2C 29 mL    LV ESV A4C 20 mL    IVSd 0.8 0.6 - 0.9 cm    LVIDd 3.4 (A) 3.9 - 5.3 cm    LVIDs 2.4 cm    LVOT Diameter 2.0 cm    LVOT Mean Gradient 2 mmHg    LVOT VTI 19.0 cm    LVOT Peak Velocity 1.0 m/s    LVOT Peak Gradient 4 mmHg    LVPWd 1.0 (A) 0.6 - 0.9 cm    LV E' Lateral Velocity 15 cm/s    LV E' Septal Velocity 8 cm/s    LV Ejection Fraction A2C 54 %    LV Ejection Fraction A4C 56 %    EF BP 56 55 - 100 %    LVOT Area 3.1 cm2    LVOT SV 59.7 ml    LA Minor Axis 5.7 cm    LA Major [place] 5.3 cm    LA Area 2C 20.0 cm2    LA Area 4C 19.8 cm2    LA Volume BP 60 (A) 22 - 52 mL    LA Diameter 3.5 cm    RA Area 4C 13.6 cm2    RA Volume 31 ml    AV Mean Gradient 7 mmHg    AV VTI 39.4 cm    AV Mean Velocity 1.3 m/s    AV Peak Velocity 1.9 m/s    AV Peak Gradient 14 mmHg    AV Area by VTI 1.5 cm2    AV Area by Peak Velocity 1.6 cm2    Aortic Root 3.1 cm    Ascending Aorta 3.4 cm    MV E Wave Deceleration Time 167.0 ms    MV A Velocity 0.46 m/s    MV E Velocity 1.38 m/s    PV Max Velocity 0.8 m/s    PV Peak Gradient 2 mmHg    RV Basal Dimension 3.3 cm    TAPSE 2.0 1.7 cm    TR Max Velocity 2.57 m/s    TR Peak Gradient 26 mmHg    Fractional Shortening 2D 29 28 - 44 %    LV ESV Index A4C 10 mL/m2    LV EDV Index A4C 24 mL/m2    LV ESV Index A2C 15 mL/m2    LV EDV Index A2C 34 mL/m2    LVIDd Index 1.78 cm/m2    LVIDs Index 1.26 cm/m2    LV RWT Ratio 0.59     LV Mass 2D 84.9 67 - 162 g    LV Mass 2D Index 44.4 43 - 95 g/m2    MV E/A 3.00     E/E' Ratio (Averaged) 13.23     E/E' Lateral 9.20     E/E' Septal 17.25     LA Volume Index BP 31 16 - 34 ml/m2    LVOT Stroke Volume Index 31.2 mL/m2    LA Size Index 1.83 cm/m2    LA/AO Root Ratio 1.13     RA Volume Index A4C 16 mL/m2    Ao Root Index 1.62 cm/m2    Ascending Aorta Index 1.78 cm/m2    AV Velocity Ratio 0.53     LVOT:AV VTI Index 0.48     ALBERTINA/BSA VTI 0.8 cm2/m2    ALBERTINA/BSA Peak Velocity 0.8 cm2/m2          HOSPITAL COURSE:    Patient is a 80y.o. year old female history of advanced dementia anxiety and depression and emergency room because of rectal bleed  And living at the assisted living facility nurse went to change her diaper found a lot of bright red blood came to emergency room seen by the ER physician recommended patient to be admitted for GI bleed GI was consulted by the ER physician patient has no nausea vomiting diarrhea no constipation no fever no chills           Patient today resting in the bed not in distress     4/27  Pt is seen in bed, more calm today. ROS is difficult to obtain as patient remains confused at baseline. Had episode of atrial fibrillation last night with slow ventricular rate. Telemetry box reads atrial flutter 41-78     4/28  Pt is resting in bed, in no acute distress. She says her eye feels better and denies chest pain or SOB. Bradycardia in the 30s last night reported and cardiology will be seeing pt today.       Cardiology consult is pending heart rates are normal today patient is alert awake confused because of advanced dementia not any distress hemoglobin stable no further work-up recommended by the GI medication reconciliation done time sharply 35 minutes 50% spent counseling and coordination of care    4/29  Stable for discharge  Signed:   Lionel Lopes MD  4/29/2023  11:48 AM

## 2023-05-03 ENCOUNTER — HOSPITAL ENCOUNTER (INPATIENT)
Age: 88
LOS: 3 days | Discharge: STILL ADMITTED OR ADMINISTRATIVE | End: 2023-05-06
Attending: EMERGENCY MEDICINE | Admitting: HOSPITALIST
Payer: MEDICARE

## 2023-05-03 ENCOUNTER — HOSPITAL ENCOUNTER (INPATIENT)
Facility: HOSPITAL | Age: 88
LOS: 5 days | Discharge: HOME HEALTH | DRG: 308 | End: 2023-05-08
Attending: HOSPITALIST | Admitting: HOSPITALIST
Payer: MEDICARE

## 2023-05-03 ENCOUNTER — APPOINTMENT (OUTPATIENT)
Dept: CT IMAGING | Age: 88
End: 2023-05-03
Attending: EMERGENCY MEDICINE
Payer: MEDICARE

## 2023-05-03 ENCOUNTER — APPOINTMENT (OUTPATIENT)
Dept: GENERAL RADIOLOGY | Age: 88
End: 2023-05-03
Attending: EMERGENCY MEDICINE
Payer: MEDICARE

## 2023-05-03 DIAGNOSIS — R00.1 BRADYCARDIA: Primary | ICD-10-CM

## 2023-05-03 LAB
ALBUMIN SERPL-MCNC: 3.3 G/DL (ref 3.5–5)
ALBUMIN/GLOB SERPL: 0.8 (ref 1.1–2.2)
ALP SERPL-CCNC: 81 U/L (ref 45–117)
ALT SERPL-CCNC: 13 U/L (ref 12–78)
ANION GAP SERPL CALC-SCNC: 4 MMOL/L (ref 5–15)
AST SERPL W P-5'-P-CCNC: 7 U/L (ref 15–37)
BASOPHILS # BLD: 0.1 K/UL (ref 0–0.1)
BASOPHILS NFR BLD: 1 % (ref 0–1)
BILIRUB SERPL-MCNC: 0.5 MG/DL (ref 0.2–1)
BNP SERPL-MCNC: 3178 PG/ML
BUN SERPL-MCNC: 9 MG/DL (ref 6–20)
BUN/CREAT SERPL: 14 (ref 12–20)
CA-I BLD-MCNC: 9 MG/DL (ref 8.5–10.1)
CHLORIDE SERPL-SCNC: 108 MMOL/L (ref 97–108)
CO2 SERPL-SCNC: 29 MMOL/L (ref 21–32)
CREAT SERPL-MCNC: 0.63 MG/DL (ref 0.55–1.02)
DIFFERENTIAL METHOD BLD: ABNORMAL
EOSINOPHIL # BLD: 0.5 K/UL (ref 0–0.4)
EOSINOPHIL NFR BLD: 5 % (ref 0–7)
ERYTHROCYTE [DISTWIDTH] IN BLOOD BY AUTOMATED COUNT: 14 % (ref 11.5–14.5)
GLOBULIN SER CALC-MCNC: 3.9 G/DL (ref 2–4)
GLUCOSE SERPL-MCNC: 95 MG/DL (ref 65–100)
HCT VFR BLD AUTO: 37.3 % (ref 35–47)
HGB BLD-MCNC: 12 G/DL (ref 11.5–16)
IMM GRANULOCYTES # BLD AUTO: 0 K/UL (ref 0–0.04)
IMM GRANULOCYTES NFR BLD AUTO: 0 % (ref 0–0.5)
LYMPHOCYTES # BLD: 2.4 K/UL (ref 0.8–3.5)
LYMPHOCYTES NFR BLD: 27 % (ref 12–49)
MAGNESIUM SERPL-MCNC: 1.6 MG/DL (ref 1.6–2.4)
MCH RBC QN AUTO: 30 PG (ref 26–34)
MCHC RBC AUTO-ENTMCNC: 32.2 G/DL (ref 30–36.5)
MCV RBC AUTO: 93.3 FL (ref 80–99)
MONOCYTES # BLD: 0.8 K/UL (ref 0–1)
MONOCYTES NFR BLD: 9 % (ref 5–13)
NEUTS SEG # BLD: 5.3 K/UL (ref 1.8–8)
NEUTS SEG NFR BLD: 58 % (ref 32–75)
NRBC # BLD: 0 K/UL (ref 0–0.01)
NRBC BLD-RTO: 0 PER 100 WBC
PLATELET # BLD AUTO: 360 K/UL (ref 150–400)
PMV BLD AUTO: 9.9 FL (ref 8.9–12.9)
POTASSIUM SERPL-SCNC: 3.7 MMOL/L (ref 3.5–5.1)
PROCALCITONIN SERPL-MCNC: <0.05 NG/ML
PROT SERPL-MCNC: 7.2 G/DL (ref 6.4–8.2)
RBC # BLD AUTO: 4 M/UL (ref 3.8–5.2)
SODIUM SERPL-SCNC: 141 MMOL/L (ref 136–145)
TROPONIN I SERPL HS-MCNC: 15 NG/L (ref 0–51)
WBC # BLD AUTO: 9.1 K/UL (ref 3.6–11)

## 2023-05-03 PROCEDURE — 84484 ASSAY OF TROPONIN QUANT: CPT

## 2023-05-03 PROCEDURE — 74011250636 HC RX REV CODE- 250/636: Performed by: HOSPITALIST

## 2023-05-03 PROCEDURE — 80053 COMPREHEN METABOLIC PANEL: CPT

## 2023-05-03 PROCEDURE — 83735 ASSAY OF MAGNESIUM: CPT

## 2023-05-03 PROCEDURE — 65610000006 HC RM INTENSIVE CARE

## 2023-05-03 PROCEDURE — 74011000250 HC RX REV CODE- 250: Performed by: HOSPITALIST

## 2023-05-03 PROCEDURE — 70450 CT HEAD/BRAIN W/O DYE: CPT

## 2023-05-03 PROCEDURE — 85025 COMPLETE CBC W/AUTO DIFF WBC: CPT

## 2023-05-03 PROCEDURE — 83880 ASSAY OF NATRIURETIC PEPTIDE: CPT

## 2023-05-03 PROCEDURE — 74011250636 HC RX REV CODE- 250/636

## 2023-05-03 PROCEDURE — 99285 EMERGENCY DEPT VISIT HI MDM: CPT

## 2023-05-03 PROCEDURE — 36415 COLL VENOUS BLD VENIPUNCTURE: CPT

## 2023-05-03 PROCEDURE — 96374 THER/PROPH/DIAG INJ IV PUSH: CPT

## 2023-05-03 PROCEDURE — 74011000250 HC RX REV CODE- 250: Performed by: EMERGENCY MEDICINE

## 2023-05-03 PROCEDURE — 93005 ELECTROCARDIOGRAM TRACING: CPT

## 2023-05-03 PROCEDURE — 96375 TX/PRO/DX INJ NEW DRUG ADDON: CPT

## 2023-05-03 PROCEDURE — 9990 CHARGE CONVERSION

## 2023-05-03 PROCEDURE — 74011000258 HC RX REV CODE- 258: Performed by: HOSPITALIST

## 2023-05-03 PROCEDURE — 71045 X-RAY EXAM CHEST 1 VIEW: CPT

## 2023-05-03 PROCEDURE — 84145 PROCALCITONIN (PCT): CPT

## 2023-05-03 PROCEDURE — 74011250637 HC RX REV CODE- 250/637: Performed by: HOSPITALIST

## 2023-05-03 PROCEDURE — 74011250636 HC RX REV CODE- 250/636: Performed by: EMERGENCY MEDICINE

## 2023-05-03 RX ORDER — ATROPINE SULFATE 0.1 MG/ML
INJECTION INTRAVENOUS
Status: COMPLETED
Start: 2023-05-03 | End: 2023-05-03

## 2023-05-03 RX ORDER — ENOXAPARIN SODIUM 100 MG/ML
40 INJECTION SUBCUTANEOUS DAILY
Status: DISCONTINUED | OUTPATIENT
Start: 2023-05-04 | End: 2023-05-06 | Stop reason: HOSPADM

## 2023-05-03 RX ORDER — ONDANSETRON 4 MG/1
4 TABLET, ORALLY DISINTEGRATING ORAL
Status: DISCONTINUED | OUTPATIENT
Start: 2023-05-03 | End: 2023-05-06 | Stop reason: HOSPADM

## 2023-05-03 RX ORDER — ATORVASTATIN CALCIUM 40 MG/1
40 TABLET, FILM COATED ORAL
Status: DISCONTINUED | OUTPATIENT
Start: 2023-05-03 | End: 2023-05-06 | Stop reason: HOSPADM

## 2023-05-03 RX ORDER — ACETAMINOPHEN 650 MG/1
650 SUPPOSITORY RECTAL
Status: DISCONTINUED | OUTPATIENT
Start: 2023-05-03 | End: 2023-05-06 | Stop reason: HOSPADM

## 2023-05-03 RX ORDER — METOPROLOL TARTRATE 50 MG/1
50 TABLET ORAL EVERY 12 HOURS
COMMUNITY

## 2023-05-03 RX ORDER — DOCUSATE SODIUM 100 MG/1
100 CAPSULE, LIQUID FILLED ORAL
COMMUNITY

## 2023-05-03 RX ORDER — ATROPINE SULFATE 0.1 MG/ML
0.5 INJECTION INTRAVENOUS ONCE
Status: COMPLETED | OUTPATIENT
Start: 2023-05-03 | End: 2023-05-03

## 2023-05-03 RX ORDER — SENNOSIDES 8.6 MG/1
1 TABLET ORAL
COMMUNITY

## 2023-05-03 RX ORDER — GUAIFENESIN 100 MG/5ML
81 LIQUID (ML) ORAL DAILY
Status: DISCONTINUED | OUTPATIENT
Start: 2023-05-04 | End: 2023-05-06 | Stop reason: HOSPADM

## 2023-05-03 RX ORDER — POTASSIUM CHLORIDE 1500 MG/1
20 TABLET, FILM COATED, EXTENDED RELEASE ORAL DAILY
COMMUNITY

## 2023-05-03 RX ORDER — FUROSEMIDE 10 MG/ML
20 INJECTION INTRAMUSCULAR; INTRAVENOUS
Status: COMPLETED | OUTPATIENT
Start: 2023-05-03 | End: 2023-05-03

## 2023-05-03 RX ORDER — ATROPINE SULFATE 0.1 MG/ML
0.5 INJECTION INTRAVENOUS
Status: DISCONTINUED | OUTPATIENT
Start: 2023-05-03 | End: 2023-05-06 | Stop reason: HOSPADM

## 2023-05-03 RX ORDER — ACETAMINOPHEN 325 MG/1
650 TABLET ORAL
Status: DISCONTINUED | OUTPATIENT
Start: 2023-05-03 | End: 2023-05-06 | Stop reason: HOSPADM

## 2023-05-03 RX ORDER — ONDANSETRON 2 MG/ML
4 INJECTION INTRAMUSCULAR; INTRAVENOUS
Status: DISCONTINUED | OUTPATIENT
Start: 2023-05-03 | End: 2023-05-06 | Stop reason: HOSPADM

## 2023-05-03 RX ORDER — NALOXONE HYDROCHLORIDE 0.4 MG/ML
0.4 INJECTION, SOLUTION INTRAMUSCULAR; INTRAVENOUS; SUBCUTANEOUS
Status: ACTIVE | OUTPATIENT
Start: 2023-05-03 | End: 2023-05-04

## 2023-05-03 RX ORDER — SODIUM CHLORIDE 0.9 % (FLUSH) 0.9 %
5-40 SYRINGE (ML) INJECTION AS NEEDED
Status: DISCONTINUED | OUTPATIENT
Start: 2023-05-03 | End: 2023-05-06 | Stop reason: HOSPADM

## 2023-05-03 RX ORDER — POLYETHYLENE GLYCOL 3350 17 G/17G
17 POWDER, FOR SOLUTION ORAL DAILY PRN
Status: DISCONTINUED | OUTPATIENT
Start: 2023-05-03 | End: 2023-05-06 | Stop reason: HOSPADM

## 2023-05-03 RX ORDER — SODIUM CHLORIDE 0.9 % (FLUSH) 0.9 %
5-40 SYRINGE (ML) INJECTION EVERY 8 HOURS
Status: DISCONTINUED | OUTPATIENT
Start: 2023-05-03 | End: 2023-05-06 | Stop reason: HOSPADM

## 2023-05-03 RX ORDER — PETROLATUM 42 G/100G
OINTMENT TOPICAL DAILY
COMMUNITY

## 2023-05-03 RX ORDER — PAROXETINE 10 MG/1
10 TABLET, FILM COATED ORAL DAILY
Status: DISCONTINUED | OUTPATIENT
Start: 2023-05-04 | End: 2023-05-06 | Stop reason: HOSPADM

## 2023-05-03 RX ORDER — ATROPINE SULFATE 0.4 MG/ML
0.5 INJECTION, SOLUTION ENDOTRACHEAL; INTRAMEDULLARY; INTRAMUSCULAR; INTRAVENOUS; SUBCUTANEOUS ONCE
Status: DISCONTINUED | OUTPATIENT
Start: 2023-05-03 | End: 2023-05-03 | Stop reason: RX

## 2023-05-03 RX ADMIN — ATROPINE SULFATE 0.5 MG: 0.1 INJECTION INTRAVENOUS at 13:31

## 2023-05-03 RX ADMIN — SODIUM CHLORIDE 3 G: 900 INJECTION INTRAVENOUS at 17:45

## 2023-05-03 RX ADMIN — ATROPINE SULFATE 0.5 MG: 0.1 INJECTION, SOLUTION INTRAVENOUS at 13:31

## 2023-05-03 RX ADMIN — FUROSEMIDE 20 MG: 10 INJECTION, SOLUTION INTRAMUSCULAR; INTRAVENOUS at 16:02

## 2023-05-03 RX ADMIN — SODIUM CHLORIDE, PRESERVATIVE FREE 10 ML: 5 INJECTION INTRAVENOUS at 22:01

## 2023-05-03 RX ADMIN — ATORVASTATIN CALCIUM 40 MG: 40 TABLET, FILM COATED ORAL at 22:01

## 2023-05-03 RX ADMIN — AZITHROMYCIN DIHYDRATE 500 MG: 500 INJECTION, POWDER, LYOPHILIZED, FOR SOLUTION INTRAVENOUS at 15:51

## 2023-05-03 RX ADMIN — SODIUM CHLORIDE, PRESERVATIVE FREE 10 ML: 5 INJECTION INTRAVENOUS at 17:46

## 2023-05-03 RX ADMIN — CEFTRIAXONE SODIUM 1 G: 1 INJECTION, POWDER, FOR SOLUTION INTRAMUSCULAR; INTRAVENOUS at 15:47

## 2023-05-03 RX ADMIN — SODIUM CHLORIDE, PRESERVATIVE FREE 20 MG: 5 INJECTION INTRAVENOUS at 22:00

## 2023-05-03 RX ADMIN — SODIUM CHLORIDE 3 G: 900 INJECTION INTRAVENOUS at 22:01

## 2023-05-04 LAB
AMMONIA PLAS-SCNC: 34 UMOL/L
ANION GAP SERPL CALC-SCNC: 4 MMOL/L (ref 5–15)
BASOPHILS # BLD: 0.1 K/UL (ref 0–0.1)
BASOPHILS NFR BLD: 1 % (ref 0–1)
BUN SERPL-MCNC: 7 MG/DL (ref 6–20)
BUN/CREAT SERPL: 13 (ref 12–20)
CA-I BLD-MCNC: 8.8 MG/DL (ref 8.5–10.1)
CHLORIDE SERPL-SCNC: 106 MMOL/L (ref 97–108)
CO2 SERPL-SCNC: 30 MMOL/L (ref 21–32)
CREAT SERPL-MCNC: 0.56 MG/DL (ref 0.55–1.02)
DIFFERENTIAL METHOD BLD: NORMAL
EOSINOPHIL # BLD: 0.4 K/UL (ref 0–0.4)
EOSINOPHIL NFR BLD: 4 % (ref 0–7)
ERYTHROCYTE [DISTWIDTH] IN BLOOD BY AUTOMATED COUNT: 14 % (ref 11.5–14.5)
GLUCOSE SERPL-MCNC: 89 MG/DL (ref 65–100)
HCT VFR BLD AUTO: 37.9 % (ref 35–47)
HGB BLD-MCNC: 12.2 G/DL (ref 11.5–16)
IMM GRANULOCYTES # BLD AUTO: 0 K/UL (ref 0–0.04)
IMM GRANULOCYTES NFR BLD AUTO: 0 % (ref 0–0.5)
LYMPHOCYTES # BLD: 1.6 K/UL (ref 0.8–3.5)
LYMPHOCYTES NFR BLD: 16 % (ref 12–49)
MAGNESIUM SERPL-MCNC: 1.4 MG/DL (ref 1.6–2.4)
MCH RBC QN AUTO: 29.8 PG (ref 26–34)
MCHC RBC AUTO-ENTMCNC: 32.2 G/DL (ref 30–36.5)
MCV RBC AUTO: 92.7 FL (ref 80–99)
MONOCYTES # BLD: 1 K/UL (ref 0–1)
MONOCYTES NFR BLD: 10 % (ref 5–13)
NEUTS SEG # BLD: 7.3 K/UL (ref 1.8–8)
NEUTS SEG NFR BLD: 69 % (ref 32–75)
NRBC # BLD: 0 K/UL (ref 0–0.01)
NRBC BLD-RTO: 0 PER 100 WBC
PLATELET # BLD AUTO: 346 K/UL (ref 150–400)
PMV BLD AUTO: 9.7 FL (ref 8.9–12.9)
POTASSIUM SERPL-SCNC: 3 MMOL/L (ref 3.5–5.1)
PROLACTIN SERPL-MCNC: 8.6 NG/ML
RBC # BLD AUTO: 4.09 M/UL (ref 3.8–5.2)
SODIUM SERPL-SCNC: 140 MMOL/L (ref 136–145)
TSH SERPL DL<=0.05 MIU/L-ACNC: 4.91 UIU/ML (ref 0.36–3.74)
WBC # BLD AUTO: 10.4 K/UL (ref 3.6–11)

## 2023-05-04 PROCEDURE — 82140 ASSAY OF AMMONIA: CPT

## 2023-05-04 PROCEDURE — 74011250637 HC RX REV CODE- 250/637: Performed by: HOSPITALIST

## 2023-05-04 PROCEDURE — 36415 COLL VENOUS BLD VENIPUNCTURE: CPT

## 2023-05-04 PROCEDURE — 84443 ASSAY THYROID STIM HORMONE: CPT

## 2023-05-04 PROCEDURE — 74011250636 HC RX REV CODE- 250/636: Performed by: HOSPITALIST

## 2023-05-04 PROCEDURE — 83735 ASSAY OF MAGNESIUM: CPT

## 2023-05-04 PROCEDURE — 77010033678 HC OXYGEN DAILY

## 2023-05-04 PROCEDURE — 85025 COMPLETE CBC W/AUTO DIFF WBC: CPT

## 2023-05-04 PROCEDURE — 74011000250 HC RX REV CODE- 250: Performed by: HOSPITALIST

## 2023-05-04 PROCEDURE — 84439 ASSAY OF FREE THYROXINE: CPT

## 2023-05-04 PROCEDURE — 9990 CHARGE CONVERSION

## 2023-05-04 PROCEDURE — 84146 ASSAY OF PROLACTIN: CPT

## 2023-05-04 PROCEDURE — 80048 BASIC METABOLIC PNL TOTAL CA: CPT

## 2023-05-04 PROCEDURE — 74011000258 HC RX REV CODE- 258: Performed by: HOSPITALIST

## 2023-05-04 PROCEDURE — 65610000006 HC RM INTENSIVE CARE

## 2023-05-04 RX ORDER — POTASSIUM CHLORIDE 750 MG/1
40 TABLET, FILM COATED, EXTENDED RELEASE ORAL
Status: COMPLETED | OUTPATIENT
Start: 2023-05-04 | End: 2023-05-04

## 2023-05-04 RX ORDER — SENNOSIDES 8.6 MG/1
1 TABLET ORAL
Status: DISCONTINUED | OUTPATIENT
Start: 2023-05-04 | End: 2023-05-06 | Stop reason: HOSPADM

## 2023-05-04 RX ORDER — POTASSIUM CHLORIDE 7.45 MG/ML
10 INJECTION INTRAVENOUS
Status: COMPLETED | OUTPATIENT
Start: 2023-05-04 | End: 2023-05-04

## 2023-05-04 RX ORDER — ISOSORBIDE MONONITRATE 30 MG/1
30 TABLET, EXTENDED RELEASE ORAL DAILY
Status: DISCONTINUED | OUTPATIENT
Start: 2023-05-04 | End: 2023-05-04

## 2023-05-04 RX ORDER — HYDRALAZINE HYDROCHLORIDE 20 MG/ML
10 INJECTION INTRAMUSCULAR; INTRAVENOUS
Status: DISCONTINUED | OUTPATIENT
Start: 2023-05-04 | End: 2023-05-06 | Stop reason: HOSPADM

## 2023-05-04 RX ORDER — SPIRONOLACTONE 25 MG/1
25 TABLET ORAL DAILY
Status: DISCONTINUED | OUTPATIENT
Start: 2023-05-04 | End: 2023-05-06 | Stop reason: HOSPADM

## 2023-05-04 RX ORDER — ISOSORBIDE MONONITRATE 30 MG/1
60 TABLET, EXTENDED RELEASE ORAL DAILY
Status: DISCONTINUED | OUTPATIENT
Start: 2023-05-05 | End: 2023-05-06 | Stop reason: HOSPADM

## 2023-05-04 RX ORDER — TRAZODONE HYDROCHLORIDE 50 MG/1
25 TABLET ORAL
Status: DISCONTINUED | OUTPATIENT
Start: 2023-05-04 | End: 2023-05-06 | Stop reason: HOSPADM

## 2023-05-04 RX ORDER — MAGNESIUM SULFATE HEPTAHYDRATE 40 MG/ML
2 INJECTION, SOLUTION INTRAVENOUS ONCE
Status: COMPLETED | OUTPATIENT
Start: 2023-05-04 | End: 2023-05-04

## 2023-05-04 RX ADMIN — PAROXETINE HYDROCHLORIDE 10 MG: 10 TABLET, FILM COATED ORAL at 08:18

## 2023-05-04 RX ADMIN — ATORVASTATIN CALCIUM 40 MG: 40 TABLET, FILM COATED ORAL at 21:10

## 2023-05-04 RX ADMIN — POTASSIUM CHLORIDE 10 MEQ: 7.46 INJECTION, SOLUTION INTRAVENOUS at 11:44

## 2023-05-04 RX ADMIN — SENNOSIDES 8.6 MG: 8.6 TABLET, FILM COATED ORAL at 21:10

## 2023-05-04 RX ADMIN — SODIUM CHLORIDE, PRESERVATIVE FREE 10 ML: 5 INJECTION INTRAVENOUS at 21:10

## 2023-05-04 RX ADMIN — POTASSIUM CHLORIDE 10 MEQ: 7.46 INJECTION, SOLUTION INTRAVENOUS at 13:41

## 2023-05-04 RX ADMIN — POTASSIUM CHLORIDE 10 MEQ: 7.46 INJECTION, SOLUTION INTRAVENOUS at 15:28

## 2023-05-04 RX ADMIN — SODIUM CHLORIDE, PRESERVATIVE FREE 20 MG: 5 INJECTION INTRAVENOUS at 21:10

## 2023-05-04 RX ADMIN — SPIRONOLACTONE 25 MG: 25 TABLET ORAL at 11:42

## 2023-05-04 RX ADMIN — TRAZODONE HYDROCHLORIDE 25 MG: 50 TABLET ORAL at 21:10

## 2023-05-04 RX ADMIN — SODIUM CHLORIDE 3 G: 900 INJECTION INTRAVENOUS at 06:25

## 2023-05-04 RX ADMIN — MAGNESIUM SULFATE HEPTAHYDRATE 2 G: 40 INJECTION, SOLUTION INTRAVENOUS at 10:31

## 2023-05-04 RX ADMIN — POTASSIUM CHLORIDE 10 MEQ: 7.46 INJECTION, SOLUTION INTRAVENOUS at 12:32

## 2023-05-04 RX ADMIN — SODIUM CHLORIDE 3 G: 900 INJECTION INTRAVENOUS at 15:28

## 2023-05-04 RX ADMIN — ACETAMINOPHEN 650 MG: 325 TABLET ORAL at 21:10

## 2023-05-04 RX ADMIN — ISOSORBIDE MONONITRATE 30 MG: 30 TABLET, EXTENDED RELEASE ORAL at 10:47

## 2023-05-04 RX ADMIN — SODIUM CHLORIDE 3 G: 900 INJECTION INTRAVENOUS at 21:09

## 2023-05-04 RX ADMIN — ASPIRIN 81 MG 81 MG: 81 TABLET ORAL at 08:18

## 2023-05-04 RX ADMIN — SODIUM CHLORIDE, PRESERVATIVE FREE 10 ML: 5 INJECTION INTRAVENOUS at 06:25

## 2023-05-04 RX ADMIN — SODIUM CHLORIDE, PRESERVATIVE FREE 20 MG: 5 INJECTION INTRAVENOUS at 08:18

## 2023-05-04 RX ADMIN — SODIUM CHLORIDE, PRESERVATIVE FREE 10 ML: 5 INJECTION INTRAVENOUS at 15:29

## 2023-05-04 RX ADMIN — POTASSIUM CHLORIDE 40 MEQ: 750 TABLET, EXTENDED RELEASE ORAL at 10:36

## 2023-05-05 VITALS
HEART RATE: 80 BPM | WEIGHT: 180 LBS | HEIGHT: 66 IN | BODY MASS INDEX: 28.93 KG/M2 | DIASTOLIC BLOOD PRESSURE: 69 MMHG | RESPIRATION RATE: 22 BRPM | OXYGEN SATURATION: 98 % | SYSTOLIC BLOOD PRESSURE: 157 MMHG | TEMPERATURE: 98.4 F

## 2023-05-05 LAB
ALBUMIN SERPL-MCNC: 3.2 G/DL (ref 3.5–5)
ANION GAP SERPL CALC-SCNC: 5 MMOL/L (ref 5–15)
BUN SERPL-MCNC: 10 MG/DL (ref 6–20)
BUN/CREAT SERPL: 17 (ref 12–20)
CA-I BLD-MCNC: 9.4 MG/DL (ref 8.5–10.1)
CHLORIDE SERPL-SCNC: 107 MMOL/L (ref 97–108)
CO2 SERPL-SCNC: 28 MMOL/L (ref 21–32)
CREAT SERPL-MCNC: 0.58 MG/DL (ref 0.55–1.02)
ERYTHROCYTE [DISTWIDTH] IN BLOOD BY AUTOMATED COUNT: 14.2 % (ref 11.5–14.5)
GLUCOSE SERPL-MCNC: 98 MG/DL (ref 65–100)
HCT VFR BLD AUTO: 36.2 % (ref 35–47)
HGB BLD-MCNC: 11.6 G/DL (ref 11.5–16)
MAGNESIUM SERPL-MCNC: 1.9 MG/DL (ref 1.6–2.4)
MCH RBC QN AUTO: 29.7 PG (ref 26–34)
MCHC RBC AUTO-ENTMCNC: 32 G/DL (ref 30–36.5)
MCV RBC AUTO: 92.8 FL (ref 80–99)
NRBC # BLD: 0 K/UL (ref 0–0.01)
NRBC BLD-RTO: 0 PER 100 WBC
PHOSPHATE SERPL-MCNC: 3 MG/DL (ref 2.6–4.7)
PLATELET # BLD AUTO: 367 K/UL (ref 150–400)
PMV BLD AUTO: 10.1 FL (ref 8.9–12.9)
POTASSIUM SERPL-SCNC: 3.9 MMOL/L (ref 3.5–5.1)
RBC # BLD AUTO: 3.9 M/UL (ref 3.8–5.2)
SODIUM SERPL-SCNC: 140 MMOL/L (ref 136–145)
T4 FREE SERPL-MCNC: 1.5 NG/DL (ref 0.8–1.5)
WBC # BLD AUTO: 9 K/UL (ref 3.6–11)

## 2023-05-05 PROCEDURE — 77010033678 HC OXYGEN DAILY

## 2023-05-05 PROCEDURE — 85027 COMPLETE CBC AUTOMATED: CPT

## 2023-05-05 PROCEDURE — 51798 US URINE CAPACITY MEASURE: CPT

## 2023-05-05 PROCEDURE — 80069 RENAL FUNCTION PANEL: CPT

## 2023-05-05 PROCEDURE — 65270000029 HC RM PRIVATE

## 2023-05-05 PROCEDURE — 74011000258 HC RX REV CODE- 258: Performed by: HOSPITALIST

## 2023-05-05 PROCEDURE — 74011250636 HC RX REV CODE- 250/636: Performed by: HOSPITALIST

## 2023-05-05 PROCEDURE — 36415 COLL VENOUS BLD VENIPUNCTURE: CPT

## 2023-05-05 PROCEDURE — 74011250637 HC RX REV CODE- 250/637: Performed by: INTERNAL MEDICINE

## 2023-05-05 PROCEDURE — 74011250637 HC RX REV CODE- 250/637: Performed by: HOSPITALIST

## 2023-05-05 PROCEDURE — 83735 ASSAY OF MAGNESIUM: CPT

## 2023-05-05 PROCEDURE — 74011000250 HC RX REV CODE- 250: Performed by: HOSPITALIST

## 2023-05-05 PROCEDURE — 9990 CHARGE CONVERSION

## 2023-05-05 RX ORDER — ACETAMINOPHEN 325 MG/1
650 TABLET ORAL EVERY 6 HOURS PRN
Status: DISCONTINUED | OUTPATIENT
Start: 2023-05-06 | End: 2023-05-08 | Stop reason: HOSPADM

## 2023-05-05 RX ORDER — SENNA PLUS 8.6 MG/1
1 TABLET ORAL NIGHTLY
Status: DISCONTINUED | OUTPATIENT
Start: 2023-05-06 | End: 2023-05-08 | Stop reason: HOSPADM

## 2023-05-05 RX ORDER — ASPIRIN 81 MG/1
81 TABLET, CHEWABLE ORAL DAILY
Status: DISCONTINUED | OUTPATIENT
Start: 2023-05-06 | End: 2023-05-08 | Stop reason: HOSPADM

## 2023-05-05 RX ORDER — SPIRONOLACTONE 25 MG/1
25 TABLET ORAL DAILY
Status: DISCONTINUED | OUTPATIENT
Start: 2023-05-06 | End: 2023-05-08 | Stop reason: HOSPADM

## 2023-05-05 RX ORDER — HYDRALAZINE HYDROCHLORIDE 20 MG/ML
10 INJECTION INTRAMUSCULAR; INTRAVENOUS EVERY 6 HOURS PRN
Status: DISCONTINUED | OUTPATIENT
Start: 2023-05-06 | End: 2023-05-08 | Stop reason: HOSPADM

## 2023-05-05 RX ORDER — ATROPINE SULFATE 0.1 MG/ML
0.5 INJECTION INTRAVENOUS EVERY 4 HOURS PRN
Status: DISCONTINUED | OUTPATIENT
Start: 2023-05-06 | End: 2023-05-06 | Stop reason: RX

## 2023-05-05 RX ORDER — PAROXETINE HYDROCHLORIDE 20 MG/1
10 TABLET, FILM COATED ORAL DAILY
Status: DISCONTINUED | OUTPATIENT
Start: 2023-05-06 | End: 2023-05-08 | Stop reason: HOSPADM

## 2023-05-05 RX ORDER — ACETAMINOPHEN 650 MG/1
650 SUPPOSITORY RECTAL EVERY 6 HOURS PRN
Status: DISCONTINUED | OUTPATIENT
Start: 2023-05-06 | End: 2023-05-08 | Stop reason: HOSPADM

## 2023-05-05 RX ORDER — SODIUM CHLORIDE 0.9 % (FLUSH) 0.9 %
5-40 SYRINGE (ML) INJECTION PRN
Status: DISCONTINUED | OUTPATIENT
Start: 2023-05-06 | End: 2023-05-08 | Stop reason: HOSPADM

## 2023-05-05 RX ORDER — ATORVASTATIN CALCIUM 40 MG/1
40 TABLET, FILM COATED ORAL NIGHTLY
Status: DISCONTINUED | OUTPATIENT
Start: 2023-05-06 | End: 2023-05-08 | Stop reason: HOSPADM

## 2023-05-05 RX ORDER — POLYETHYLENE GLYCOL 3350 17 G/17G
17 POWDER, FOR SOLUTION ORAL DAILY PRN
Status: DISCONTINUED | OUTPATIENT
Start: 2023-05-06 | End: 2023-05-08 | Stop reason: HOSPADM

## 2023-05-05 RX ORDER — SODIUM CHLORIDE 0.9 % (FLUSH) 0.9 %
5-40 SYRINGE (ML) INJECTION EVERY 8 HOURS
Status: DISCONTINUED | OUTPATIENT
Start: 2023-05-06 | End: 2023-05-08 | Stop reason: HOSPADM

## 2023-05-05 RX ORDER — TRAZODONE HYDROCHLORIDE 50 MG/1
25 TABLET ORAL NIGHTLY
Status: DISCONTINUED | OUTPATIENT
Start: 2023-05-06 | End: 2023-05-06

## 2023-05-05 RX ORDER — ISOSORBIDE MONONITRATE 60 MG/1
60 TABLET, EXTENDED RELEASE ORAL DAILY
Status: DISCONTINUED | OUTPATIENT
Start: 2023-05-06 | End: 2023-05-08 | Stop reason: HOSPADM

## 2023-05-05 RX ORDER — ENOXAPARIN SODIUM 100 MG/ML
40 INJECTION SUBCUTANEOUS DAILY
Status: DISCONTINUED | OUTPATIENT
Start: 2023-05-06 | End: 2023-05-08 | Stop reason: HOSPADM

## 2023-05-05 RX ORDER — ONDANSETRON 2 MG/ML
4 INJECTION INTRAMUSCULAR; INTRAVENOUS EVERY 6 HOURS PRN
Status: DISCONTINUED | OUTPATIENT
Start: 2023-05-06 | End: 2023-05-08 | Stop reason: HOSPADM

## 2023-05-05 RX ORDER — ONDANSETRON 4 MG/1
4 TABLET, ORALLY DISINTEGRATING ORAL EVERY 8 HOURS PRN
Status: DISCONTINUED | OUTPATIENT
Start: 2023-05-06 | End: 2023-05-08 | Stop reason: HOSPADM

## 2023-05-05 RX ADMIN — SPIRONOLACTONE 25 MG: 25 TABLET ORAL at 08:04

## 2023-05-05 RX ADMIN — ASPIRIN 81 MG 81 MG: 81 TABLET ORAL at 08:04

## 2023-05-05 RX ADMIN — SODIUM CHLORIDE, PRESERVATIVE FREE 10 ML: 5 INJECTION INTRAVENOUS at 05:19

## 2023-05-05 RX ADMIN — PAROXETINE HYDROCHLORIDE 10 MG: 10 TABLET, FILM COATED ORAL at 08:04

## 2023-05-05 RX ADMIN — SODIUM CHLORIDE 3 G: 900 INJECTION INTRAVENOUS at 21:00

## 2023-05-05 RX ADMIN — SENNOSIDES 8.6 MG: 8.6 TABLET, FILM COATED ORAL at 21:00

## 2023-05-05 RX ADMIN — ATORVASTATIN CALCIUM 40 MG: 40 TABLET, FILM COATED ORAL at 21:00

## 2023-05-05 RX ADMIN — SODIUM CHLORIDE, PRESERVATIVE FREE 10 ML: 5 INJECTION INTRAVENOUS at 13:50

## 2023-05-05 RX ADMIN — TRAZODONE HYDROCHLORIDE 25 MG: 50 TABLET ORAL at 21:00

## 2023-05-05 RX ADMIN — SODIUM CHLORIDE, PRESERVATIVE FREE 20 MG: 5 INJECTION INTRAVENOUS at 08:04

## 2023-05-05 RX ADMIN — SODIUM CHLORIDE, PRESERVATIVE FREE 20 MG: 5 INJECTION INTRAVENOUS at 21:00

## 2023-05-05 RX ADMIN — ISOSORBIDE MONONITRATE 60 MG: 30 TABLET, EXTENDED RELEASE ORAL at 08:03

## 2023-05-05 RX ADMIN — ACETAMINOPHEN 650 MG: 325 TABLET ORAL at 06:12

## 2023-05-05 RX ADMIN — SODIUM CHLORIDE 3 G: 900 INJECTION INTRAVENOUS at 13:50

## 2023-05-05 RX ADMIN — SODIUM CHLORIDE, PRESERVATIVE FREE 10 ML: 5 INJECTION INTRAVENOUS at 21:03

## 2023-05-05 RX ADMIN — SODIUM CHLORIDE 3 G: 900 INJECTION INTRAVENOUS at 05:19

## 2023-05-06 LAB
ATRIAL RATE: 21 BPM
ATRIAL RATE: 441 BPM
CALCULATED R AXIS, ECG10: -12 DEGREES
CALCULATED R AXIS, ECG10: -5 DEGREES
CALCULATED T AXIS, ECG11: -59 DEGREES
CALCULATED T AXIS, ECG11: -96 DEGREES
DIAGNOSIS, 93000: NORMAL
DIAGNOSIS, 93000: NORMAL
Q-T INTERVAL, ECG07: 442 MS
Q-T INTERVAL, ECG07: 478 MS
QRS DURATION, ECG06: 80 MS
QRS DURATION, ECG06: 96 MS
QTC CALCULATION (BEZET), ECG08: 384 MS
QTC CALCULATION (BEZET), ECG08: 419 MS
VENTRICULAR RATE, ECG03: 39 BPM
VENTRICULAR RATE, ECG03: 54 BPM

## 2023-05-06 PROCEDURE — 2709999900 HC NON-CHARGEABLE SUPPLY

## 2023-05-06 PROCEDURE — 97530 THERAPEUTIC ACTIVITIES: CPT

## 2023-05-06 PROCEDURE — 2500000003 HC RX 250 WO HCPCS: Performed by: HOSPITALIST

## 2023-05-06 PROCEDURE — 2500000003 HC RX 250 WO HCPCS

## 2023-05-06 PROCEDURE — 6360000002 HC RX W HCPCS: Performed by: HOSPITALIST

## 2023-05-06 PROCEDURE — 6370000000 HC RX 637 (ALT 250 FOR IP)

## 2023-05-06 PROCEDURE — 2000000000 HC ICU R&B

## 2023-05-06 PROCEDURE — 6370000000 HC RX 637 (ALT 250 FOR IP): Performed by: HOSPITALIST

## 2023-05-06 PROCEDURE — 2580000003 HC RX 258: Performed by: HOSPITALIST

## 2023-05-06 PROCEDURE — 97161 PT EVAL LOW COMPLEX 20 MIN: CPT

## 2023-05-06 RX ORDER — TRAZODONE HYDROCHLORIDE 50 MG/1
50 TABLET ORAL NIGHTLY
Status: DISCONTINUED | OUTPATIENT
Start: 2023-05-06 | End: 2023-05-08 | Stop reason: HOSPADM

## 2023-05-06 RX ORDER — SENNA PLUS 8.6 MG/1
TABLET ORAL
Status: COMPLETED
Start: 2023-05-06 | End: 2023-05-06

## 2023-05-06 RX ORDER — ATROPINE SULFATE 0.1 MG/ML
0.5 INJECTION INTRAVENOUS EVERY 4 HOURS PRN
Status: DISCONTINUED | OUTPATIENT
Start: 2023-05-06 | End: 2023-05-08 | Stop reason: HOSPADM

## 2023-05-06 RX ORDER — FAMOTIDINE 10 MG/ML
INJECTION, SOLUTION INTRAVENOUS
Status: DISPENSED
Start: 2023-05-06 | End: 2023-05-07

## 2023-05-06 RX ORDER — QUETIAPINE FUMARATE 25 MG/1
25 TABLET, FILM COATED ORAL 2 TIMES DAILY
Status: DISCONTINUED | OUTPATIENT
Start: 2023-05-06 | End: 2023-05-08 | Stop reason: HOSPADM

## 2023-05-06 RX ADMIN — SODIUM CHLORIDE, PRESERVATIVE FREE 10 ML: 5 INJECTION INTRAVENOUS at 13:30

## 2023-05-06 RX ADMIN — SODIUM CHLORIDE 3000 MG: 900 INJECTION INTRAVENOUS at 13:37

## 2023-05-06 RX ADMIN — ISOSORBIDE MONONITRATE 60 MG: 60 TABLET, EXTENDED RELEASE ORAL at 09:04

## 2023-05-06 RX ADMIN — QUETIAPINE FUMARATE 25 MG: 25 TABLET ORAL at 11:39

## 2023-05-06 RX ADMIN — SENNOSIDES 8.6 MG: 8.6 TABLET, FILM COATED ORAL at 22:42

## 2023-05-06 RX ADMIN — TRAZODONE HYDROCHLORIDE 50 MG: 50 TABLET ORAL at 22:42

## 2023-05-06 RX ADMIN — SODIUM CHLORIDE, PRESERVATIVE FREE 20 MG: 5 INJECTION INTRAVENOUS at 09:19

## 2023-05-06 RX ADMIN — QUETIAPINE FUMARATE 25 MG: 25 TABLET ORAL at 22:42

## 2023-05-06 RX ADMIN — SODIUM CHLORIDE 3000 MG: 900 INJECTION INTRAVENOUS at 22:30

## 2023-05-06 RX ADMIN — SODIUM CHLORIDE, PRESERVATIVE FREE 20 MG: 5 INJECTION INTRAVENOUS at 22:43

## 2023-05-06 RX ADMIN — SODIUM CHLORIDE, PRESERVATIVE FREE 10 ML: 5 INJECTION INTRAVENOUS at 08:51

## 2023-05-06 RX ADMIN — PAROXETINE HYDROCHLORIDE 10 MG: 20 TABLET, FILM COATED ORAL at 09:04

## 2023-05-06 RX ADMIN — ATORVASTATIN CALCIUM 40 MG: 40 TABLET, FILM COATED ORAL at 22:48

## 2023-05-06 RX ADMIN — ASPIRIN 81 MG 81 MG: 81 TABLET ORAL at 09:05

## 2023-05-06 RX ADMIN — SODIUM CHLORIDE, PRESERVATIVE FREE 10 ML: 5 INJECTION INTRAVENOUS at 22:44

## 2023-05-06 RX ADMIN — SODIUM CHLORIDE 3000 MG: 900 INJECTION INTRAVENOUS at 07:31

## 2023-05-06 RX ADMIN — SPIRONOLACTONE 25 MG: 25 TABLET ORAL at 09:05

## 2023-05-07 PROCEDURE — 2580000003 HC RX 258: Performed by: HOSPITALIST

## 2023-05-07 PROCEDURE — 6370000000 HC RX 637 (ALT 250 FOR IP): Performed by: HOSPITALIST

## 2023-05-07 PROCEDURE — 6360000002 HC RX W HCPCS: Performed by: HOSPITALIST

## 2023-05-07 PROCEDURE — 1100000000 HC RM PRIVATE

## 2023-05-07 PROCEDURE — 2500000003 HC RX 250 WO HCPCS: Performed by: HOSPITALIST

## 2023-05-07 RX ADMIN — SENNOSIDES 8.6 MG: 8.6 TABLET, FILM COATED ORAL at 20:27

## 2023-05-07 RX ADMIN — QUETIAPINE FUMARATE 25 MG: 25 TABLET ORAL at 20:27

## 2023-05-07 RX ADMIN — TRAZODONE HYDROCHLORIDE 50 MG: 50 TABLET ORAL at 20:27

## 2023-05-07 RX ADMIN — SODIUM CHLORIDE, PRESERVATIVE FREE 10 ML: 5 INJECTION INTRAVENOUS at 05:05

## 2023-05-07 RX ADMIN — ACETAMINOPHEN 650 MG: 325 TABLET ORAL at 20:26

## 2023-05-07 RX ADMIN — SODIUM CHLORIDE 3000 MG: 900 INJECTION INTRAVENOUS at 23:38

## 2023-05-07 RX ADMIN — SODIUM CHLORIDE, PRESERVATIVE FREE 20 MG: 5 INJECTION INTRAVENOUS at 23:39

## 2023-05-07 RX ADMIN — ENOXAPARIN SODIUM 40 MG: 100 INJECTION SUBCUTANEOUS at 10:26

## 2023-05-07 RX ADMIN — SODIUM CHLORIDE 3000 MG: 900 INJECTION INTRAVENOUS at 05:05

## 2023-05-07 RX ADMIN — SODIUM CHLORIDE, PRESERVATIVE FREE 20 MG: 5 INJECTION INTRAVENOUS at 10:26

## 2023-05-07 RX ADMIN — ATORVASTATIN CALCIUM 40 MG: 40 TABLET, FILM COATED ORAL at 20:27

## 2023-05-07 RX ADMIN — SODIUM CHLORIDE, PRESERVATIVE FREE 10 ML: 5 INJECTION INTRAVENOUS at 14:26

## 2023-05-07 RX ADMIN — SODIUM CHLORIDE 3000 MG: 900 INJECTION INTRAVENOUS at 14:26

## 2023-05-07 ASSESSMENT — PAIN SCALES - WONG BAKER: WONGBAKER_NUMERICALRESPONSE: 0

## 2023-05-08 VITALS
TEMPERATURE: 98.7 F | OXYGEN SATURATION: 97 % | HEART RATE: 104 BPM | HEIGHT: 66 IN | SYSTOLIC BLOOD PRESSURE: 121 MMHG | DIASTOLIC BLOOD PRESSURE: 65 MMHG | BODY MASS INDEX: 24.84 KG/M2 | WEIGHT: 154.54 LBS | RESPIRATION RATE: 20 BRPM

## 2023-05-08 PROBLEM — K92.2 GI BLEED: Status: RESOLVED | Noted: 2023-04-25 | Resolved: 2023-05-08

## 2023-05-08 PROBLEM — R00.1 BRADYCARDIA: Status: ACTIVE | Noted: 2023-05-03

## 2023-05-08 PROBLEM — G93.40 ENCEPHALOPATHY: Status: ACTIVE | Noted: 2023-05-08

## 2023-05-08 PROBLEM — J69.0 ASPIRATION PNEUMONIA (HCC): Status: ACTIVE | Noted: 2023-05-08

## 2023-05-08 PROBLEM — K92.2 ACUTE GI BLEEDING: Status: RESOLVED | Noted: 2023-04-25 | Resolved: 2023-05-08

## 2023-05-08 LAB
ANION GAP SERPL CALC-SCNC: 4 MMOL/L (ref 5–15)
BASOPHILS # BLD: 0.1 K/UL (ref 0–0.1)
BASOPHILS NFR BLD: 1 % (ref 0–1)
BUN SERPL-MCNC: 9 MG/DL (ref 6–20)
BUN/CREAT SERPL: 15 (ref 12–20)
CA-I BLD-MCNC: 8.8 MG/DL (ref 8.5–10.1)
CHLORIDE SERPL-SCNC: 107 MMOL/L (ref 97–108)
CO2 SERPL-SCNC: 30 MMOL/L (ref 21–32)
CREAT SERPL-MCNC: 0.62 MG/DL (ref 0.55–1.02)
DIFFERENTIAL METHOD BLD: ABNORMAL
EOSINOPHIL # BLD: 0.3 K/UL (ref 0–0.4)
EOSINOPHIL NFR BLD: 4 % (ref 0–7)
ERYTHROCYTE [DISTWIDTH] IN BLOOD BY AUTOMATED COUNT: 14.3 % (ref 11.5–14.5)
GLUCOSE SERPL-MCNC: 100 MG/DL (ref 65–100)
HCT VFR BLD AUTO: 35.2 % (ref 35–47)
HGB BLD-MCNC: 11.4 G/DL (ref 11.5–16)
IMM GRANULOCYTES # BLD AUTO: 0 K/UL (ref 0–0.04)
IMM GRANULOCYTES NFR BLD AUTO: 0 % (ref 0–0.5)
LYMPHOCYTES # BLD: 1.3 K/UL (ref 0.8–3.5)
LYMPHOCYTES NFR BLD: 16 % (ref 12–49)
MCH RBC QN AUTO: 29.8 PG (ref 26–34)
MCHC RBC AUTO-ENTMCNC: 32.4 G/DL (ref 30–36.5)
MCV RBC AUTO: 92.1 FL (ref 80–99)
MONOCYTES # BLD: 1.1 K/UL (ref 0–1)
MONOCYTES NFR BLD: 14 % (ref 5–13)
NEUTS SEG # BLD: 5.3 K/UL (ref 1.8–8)
NEUTS SEG NFR BLD: 65 % (ref 32–75)
NRBC # BLD: 0 K/UL (ref 0–0.01)
NRBC BLD-RTO: 0 PER 100 WBC
PLATELET # BLD AUTO: 348 K/UL (ref 150–400)
PMV BLD AUTO: 10 FL (ref 8.9–12.9)
POTASSIUM SERPL-SCNC: 2.9 MMOL/L (ref 3.5–5.1)
POTASSIUM SERPL-SCNC: 3.4 MMOL/L (ref 3.5–5.1)
RBC # BLD AUTO: 3.82 M/UL (ref 3.8–5.2)
SODIUM SERPL-SCNC: 141 MMOL/L (ref 136–145)
T3FREE SERPL-MCNC: 2.1 PG/ML (ref 2.2–4)
T4 FREE SERPL-MCNC: 1.5 NG/DL (ref 0.8–1.5)
WBC # BLD AUTO: 8 K/UL (ref 3.6–11)

## 2023-05-08 PROCEDURE — 2500000003 HC RX 250 WO HCPCS: Performed by: HOSPITALIST

## 2023-05-08 PROCEDURE — 80048 BASIC METABOLIC PNL TOTAL CA: CPT

## 2023-05-08 PROCEDURE — 85025 COMPLETE CBC W/AUTO DIFF WBC: CPT

## 2023-05-08 PROCEDURE — 6360000002 HC RX W HCPCS: Performed by: HOSPITALIST

## 2023-05-08 PROCEDURE — 6360000002 HC RX W HCPCS: Performed by: STUDENT IN AN ORGANIZED HEALTH CARE EDUCATION/TRAINING PROGRAM

## 2023-05-08 PROCEDURE — 2580000003 HC RX 258: Performed by: HOSPITALIST

## 2023-05-08 PROCEDURE — 84481 FREE ASSAY (FT-3): CPT

## 2023-05-08 PROCEDURE — 6370000000 HC RX 637 (ALT 250 FOR IP): Performed by: STUDENT IN AN ORGANIZED HEALTH CARE EDUCATION/TRAINING PROGRAM

## 2023-05-08 PROCEDURE — 36415 COLL VENOUS BLD VENIPUNCTURE: CPT

## 2023-05-08 PROCEDURE — 84132 ASSAY OF SERUM POTASSIUM: CPT

## 2023-05-08 PROCEDURE — 84439 ASSAY OF FREE THYROXINE: CPT

## 2023-05-08 PROCEDURE — 6370000000 HC RX 637 (ALT 250 FOR IP): Performed by: HOSPITALIST

## 2023-05-08 RX ORDER — SPIRONOLACTONE 25 MG/1
25 TABLET ORAL DAILY
Qty: 30 TABLET | Refills: 3 | Status: SHIPPED | OUTPATIENT
Start: 2023-05-08

## 2023-05-08 RX ORDER — ASPIRIN 81 MG/1
81 TABLET, CHEWABLE ORAL DAILY
Qty: 30 TABLET | Refills: 3 | Status: SHIPPED | OUTPATIENT
Start: 2023-05-08

## 2023-05-08 RX ORDER — POTASSIUM CHLORIDE 7.45 MG/ML
10 INJECTION INTRAVENOUS
Status: ACTIVE | OUTPATIENT
Start: 2023-05-08 | End: 2023-05-08

## 2023-05-08 RX ORDER — ISOSORBIDE MONONITRATE 60 MG/1
60 TABLET, EXTENDED RELEASE ORAL DAILY
Qty: 30 TABLET | Refills: 3 | Status: SHIPPED | OUTPATIENT
Start: 2023-05-08

## 2023-05-08 RX ORDER — ATORVASTATIN CALCIUM 40 MG/1
40 TABLET, FILM COATED ORAL NIGHTLY
Qty: 30 TABLET | Refills: 3 | Status: SHIPPED | OUTPATIENT
Start: 2023-05-08

## 2023-05-08 RX ORDER — PAROXETINE 10 MG/1
10 TABLET, FILM COATED ORAL DAILY
Qty: 30 TABLET | Refills: 3 | Status: SHIPPED | OUTPATIENT
Start: 2023-05-08

## 2023-05-08 RX ORDER — POTASSIUM CHLORIDE 7.45 MG/ML
10 INJECTION INTRAVENOUS
Status: DISCONTINUED | OUTPATIENT
Start: 2023-05-08 | End: 2023-05-08

## 2023-05-08 RX ORDER — AMOXICILLIN AND CLAVULANATE POTASSIUM 875; 125 MG/1; MG/1
1 TABLET, FILM COATED ORAL 2 TIMES DAILY
Qty: 20 TABLET | Refills: 0 | Status: SHIPPED | OUTPATIENT
Start: 2023-05-08 | End: 2023-05-18

## 2023-05-08 RX ORDER — POTASSIUM CHLORIDE 20 MEQ/1
40 TABLET, EXTENDED RELEASE ORAL ONCE
Status: COMPLETED | OUTPATIENT
Start: 2023-05-08 | End: 2023-05-08

## 2023-05-08 RX ADMIN — SODIUM CHLORIDE, PRESERVATIVE FREE 20 MG: 5 INJECTION INTRAVENOUS at 09:17

## 2023-05-08 RX ADMIN — SODIUM CHLORIDE, PRESERVATIVE FREE 10 ML: 5 INJECTION INTRAVENOUS at 06:07

## 2023-05-08 RX ADMIN — SODIUM CHLORIDE, PRESERVATIVE FREE 10 ML: 5 INJECTION INTRAVENOUS at 15:09

## 2023-05-08 RX ADMIN — ASPIRIN 81 MG 81 MG: 81 TABLET ORAL at 08:59

## 2023-05-08 RX ADMIN — POTASSIUM CHLORIDE 40 MEQ: 1500 TABLET, EXTENDED RELEASE ORAL at 11:48

## 2023-05-08 RX ADMIN — POTASSIUM CHLORIDE 10 MEQ: 7.46 INJECTION, SOLUTION INTRAVENOUS at 12:08

## 2023-05-08 RX ADMIN — SPIRONOLACTONE 25 MG: 25 TABLET ORAL at 08:59

## 2023-05-08 RX ADMIN — ENOXAPARIN SODIUM 40 MG: 100 INJECTION SUBCUTANEOUS at 08:59

## 2023-05-08 RX ADMIN — SODIUM CHLORIDE 3000 MG: 900 INJECTION INTRAVENOUS at 06:07

## 2023-05-08 RX ADMIN — PAROXETINE HYDROCHLORIDE 10 MG: 20 TABLET, FILM COATED ORAL at 09:17

## 2023-05-08 RX ADMIN — SODIUM CHLORIDE, PRESERVATIVE FREE 10 ML: 5 INJECTION INTRAVENOUS at 00:32

## 2023-05-08 RX ADMIN — QUETIAPINE FUMARATE 25 MG: 25 TABLET ORAL at 08:59

## 2023-05-08 RX ADMIN — ISOSORBIDE MONONITRATE 60 MG: 60 TABLET, EXTENDED RELEASE ORAL at 08:59

## 2023-05-08 RX ADMIN — SODIUM CHLORIDE 3000 MG: 900 INJECTION INTRAVENOUS at 14:56

## 2023-05-08 ASSESSMENT — PAIN SCALES - WONG BAKER: WONGBAKER_NUMERICALRESPONSE: 0

## 2023-05-08 ASSESSMENT — PAIN SCALES - GENERAL: PAINLEVEL_OUTOF10: 0

## 2023-05-08 NOTE — PROGRESS NOTES
Attempted PT 3513 however pt declining mobility at this time despite encouragement, nursing advising to attempt at a later time. Will continue to follow and attempt when appropriate. Thank you.

## 2023-05-08 NOTE — PROGRESS NOTES
Progress Note          Patient: Beckie Butler  MRN: [id number]   SSN: [id number]          YOB: 1932   Age: 80 y.o. Sex: female         Admit Date: 5/3/2023        Subjective:        No acute events overnight        Objective:          Vitals:             05/05/23 0231  05/05/23 0300  05/05/23 0400  05/05/23 0600           BP:  (!) 140/70  (!) 148/90  (!) 149/73  (!) 150/73     Pulse:  72  81  75  78     Resp:  12  24  (!) 31  (!) 32     Temp:      97.6 °F (36.4 °C)       SpO2:  98%  99%  98%  98%     Weight:                   Height:                    Intake and Output:   Current Shift: No intake/output data recorded. Last three shifts: 05/03 1901 - 05/05 0700   In: 1050 [I.V.:1050]   Out: 1850 [Urine:1850]      Physical Exam:       General:   More alert, oriented. Eyes:   Conjunctivae/corneas clear. PERRL, EOMs intact. Fundi benign        Ears:   Normal TMs and external ear canals both ears. Nose:  Nares normal. Septum midline. Mucosa normal. No drainage or sinus tenderness. Mouth/Throat:  Lips, mucosa, and tongue normal. Teeth and gums normal.        Neck:  Supple, symmetrical, trachea midline, no adenopathy, thyroid: no enlargment/tenderness/nodules, no carotid bruit and no JVD. Back:    Symmetric, no curvature. ROM normal. No CVA tenderness. Lungs:    Clear to auscultation bilaterally. Heart:   Irregular, viable S1     Abdomen:    Soft, non-tender. Bowel sounds normal. No masses,  No organomegaly. Extremities:  Extremities normal, atraumatic, no cyanosis or edema. Pulses:  2+ and symmetric all extremities. Skin:  Skin color, texture, turgor normal. No rashes or lesions     Lymph nodes:  Cervical, supraclavicular, and axillary nodes normal.        Neurologic:  More alert, oriented. Lab/Data Review: All lab results for the last 24 hours reviewed.                Assessment:        Active Problems:     Bradycardia (5/3/2023)

## 2023-05-08 NOTE — PROGRESS NOTES
OT eval order received and acknowledged. OT eval attempted at 11:44 AM, however, pt declining mobility with nsg advising to attempt session at a later time. OT will continue to follow and attempt eval at a later time. Thank you.

## 2023-05-08 NOTE — CARE COORDINATION
Transition of Care Plan: return to custodial, [facility]     RUR:  Prior Level of Functioning: wheelchair bound  Disposition: return to custodial  If SNF or IPR: Date FOC offered:  Date FOC received:  Accepting facility:  Date authorization started with reference number:  Date authorization received and expires: Follow up appointments:  DME needed: patient owns all required DME  Transportation at discharge: Family requesting ambulance, states she has not walked in years,  explained that she was able to transfer with therapy with assistance and it may not be approved. IM/IMM Medicare/ letter given: yes  Is patient a  and connected with [facility]? If yes, was [employer] transfer form completed and [facility] notified? Caregiver Contact: [facility], [phone]  Discharge Caregiver contacted prior to discharge? yes  Care Conference needed? Not at this time  Barriers to discharge: none    Patient is clear to d/c from  to her custodial, [facility], where she resides in the memory care unit. Nurse report can be called to [phone]. Gay Amado [phone], contacted and notified, he is agreeable to d/c today, IMM delivered, he stated patient will require ambulance transport, nurse notified.

## 2023-05-08 NOTE — DISCHARGE SUMMARY
isosorbide mononitrate 60 MG extended release tablet  Commonly known as: IMDUR  Take 1 tablet by mouth daily     PARoxetine 10 MG tablet  Commonly known as: PAXIL  Take 1 tablet by mouth daily     spironolactone 25 MG tablet  Commonly known as: ALDACTONE  Take 1 tablet by mouth daily               Where to Get Your Medications        Information about where to get these medications is not yet available    Ask your nurse or doctor about these medications  amoxicillin-clavulanate 875-125 MG per tablet  aspirin 81 MG chewable tablet  atorvastatin 40 MG tablet  isosorbide mononitrate 60 MG extended release tablet  PARoxetine 10 MG tablet  spironolactone 25 MG tablet           Follow up Care:    1. None None in 1-2 weeks. Varun Arellano MD  [address] [address]  [phone]    Schedule an appointment as soon as possible for a visit in 2 week(s)  Follow-up bradycardia    RAHAT Levy NP  [address] [place] [address]  [phone]    Schedule an appointment as soon as possible for a visit in 1 week(s)  Establish PCP    [facility] EMERGENC[facility] [address] [facility]  [phone]  Follow up  If symptoms worsen, As needed         Patient Follow Up Instructions: Activity: activity as tolerated  Diet:   resume previous diet  Wound care: None required    Condition at Discharge:  Stable  __________________________________________________________________    [facility]    ____________________________________________________________________    Code Status:  Full Code     ___________________________________________________________________    Discharge Exam:  Patient seen and examined by me on discharge day. Pertinent Findings:    Gen:    Not in distress  Chest: Clear lungs without wheezing, rhonchi, or rales. On room air. CVS:   Regular rate and rhythm. +S1/S2. NO murmur or gallop. No edema  Abd:  Soft, not distended, not tender.

## 2023-05-08 NOTE — PROGRESS NOTES
[id number] [facility] contacted for discharge transportation. 4:00pm was the time given for pick-up. Primary nurse, charge nurse, and CM notified.

## 2023-05-08 NOTE — PROGRESS NOTES
Discharge paperwork printed and given to  to send with patient upon transport to facility. Primary nurse to remove IV.

## 2023-05-08 NOTE — PROGRESS NOTES
NEURO PROGRESS NOTE        SUBJECTIVE:   Memory loss  beclouded dementia      EXAM:  Somnolent this morning  Opens eyes transiently to shaking  Attempted to follow commands this morning      ASSESSMENT/PLAN:  Neurophysiology pending    ALLERGIES:    No Known Allergies    MEDS:      Current Facility-Administered Medications:     QUEtiapine (SEROQUEL) tablet 25 mg, 25 mg, Oral, BID, Ty Rich MD, 25 mg at 05/08/23 0859    traZODone (DESYREL) tablet 50 mg, 50 mg, Oral, Nightly, Ty Rich MD, 50 mg at 05/07/23 2027    atropine injection 0.5 mg, 0.5 mg, IntraVENous, Q4H PRN, Ty Rich MD    ampicillin-sulbactam (UNASYN) 3,000 mg in sodium chloride 0.9 % 100 mL IVPB (mini-bag), 3,000 mg, IntraVENous, q8h, Ty Rich MD, Stopped at 05/08/23 1509    aspirin chewable tablet 81 mg, 81 mg, Oral, Daily, Ty Rich MD, 81 mg at 05/08/23 0859    atorvastatin (LIPITOR) tablet 40 mg, 40 mg, Oral, Nightly, Ty Rich MD, 40 mg at 05/07/23 2027    enoxaparin (LOVENOX) injection 40 mg, 40 mg, SubCUTAneous, Daily, Ty Rich MD, 40 mg at 05/08/23 0859    famotidine (PEPCID) 20 mg in sodium chloride (PF) 0.9 % 10 mL injection, 20 mg, IntraVENous, BID, Ty Rich MD, 20 mg at 05/08/23 4344    isosorbide mononitrate (IMDUR) extended release tablet 60 mg, 60 mg, Oral, Daily, Ty Rich MD, 60 mg at 05/08/23 0859    PARoxetine (PAXIL) tablet 10 mg, 10 mg, Oral, Daily, Ty Rich MD, 10 mg at 05/08/23 5923    senna (SENOKOT) tablet 8.6 mg, 1 tablet, Oral, Nightly, Ty Rich MD, 8.6 mg at 05/07/23 2027    sodium chloride flush 0.9 % injection 5-40 mL, 5-40 mL, IntraVENous, Q8H, Ty Rich MD, 10 mL at 05/08/23 1509    sodium chloride flush 0.9 % injection 5-40 mL, 5-40 mL, IntraVENous, PRN, Ty Rich MD    spironolactone (ALDACTONE) tablet 25 mg, 25 mg, Oral, Daily, Ty Rich MD, 25 mg at 05/08/23 0859    acetaminophen (TYLENOL) tablet 650 mg, 650 mg, Oral, Q6H PRN, 650 mg at 05/07/23 2026 **OR** acetaminophen (TYLENOL)

## 2023-05-08 NOTE — PROGRESS NOTES
Patient discharged. Telemetry Box and IV removed. Pt left via stretcher in an ambulance with her belongings. Paperwork and scripts was given to ambulance attendants.